# Patient Record
Sex: FEMALE | Race: WHITE | Employment: OTHER | URBAN - METROPOLITAN AREA
[De-identification: names, ages, dates, MRNs, and addresses within clinical notes are randomized per-mention and may not be internally consistent; named-entity substitution may affect disease eponyms.]

---

## 2019-12-09 ENCOUNTER — OFFICE VISIT (OUTPATIENT)
Dept: FAMILY MEDICINE CLINIC | Age: 19
End: 2019-12-09
Payer: MEDICAID

## 2019-12-09 VITALS
DIASTOLIC BLOOD PRESSURE: 72 MMHG | BODY MASS INDEX: 33.37 KG/M2 | OXYGEN SATURATION: 98 % | WEIGHT: 159 LBS | HEART RATE: 97 BPM | HEIGHT: 58 IN | SYSTOLIC BLOOD PRESSURE: 120 MMHG

## 2019-12-09 DIAGNOSIS — Q87.11 PRADER-WILLI SYNDROME: ICD-10-CM

## 2019-12-09 DIAGNOSIS — F41.1 GENERALIZED ANXIETY DISORDER: ICD-10-CM

## 2019-12-09 DIAGNOSIS — Z00.00 ANNUAL PHYSICAL EXAM: Primary | ICD-10-CM

## 2019-12-09 PROBLEM — K62.5 RECTAL BLEEDING: Status: RESOLVED | Noted: 2018-08-09 | Resolved: 2019-12-09

## 2019-12-09 PROBLEM — K62.5 RECTAL BLEEDING: Status: ACTIVE | Noted: 2018-08-09

## 2019-12-09 PROBLEM — H52.31 ANISOMETROPIA: Status: ACTIVE | Noted: 2017-03-07

## 2019-12-09 PROBLEM — R32 INCONTINENCE: Status: ACTIVE | Noted: 2018-11-07

## 2019-12-09 PROBLEM — F80.82 SOCIAL PRAGMATIC LANGUAGE DISORDER: Status: ACTIVE | Noted: 2017-02-19

## 2019-12-09 PROBLEM — K64.4 EXTERNAL HEMORRHOIDS WITH COMPLICATION: Status: ACTIVE | Noted: 2018-12-18

## 2019-12-09 LAB
BILIRUBIN, POC: NORMAL
BLOOD URINE, POC: NORMAL
CLARITY, POC: CLEAR
COLOR, POC: YELLOW
GLUCOSE URINE, POC: NORMAL
KETONES, POC: NORMAL
LEUKOCYTE EST, POC: NORMAL
NITRITE, POC: NORMAL
PH, POC: 5.5
PROTEIN, POC: NORMAL
SPECIFIC GRAVITY, POC: <=1.005
UROBILINOGEN, POC: NORMAL

## 2019-12-09 PROCEDURE — G8482 FLU IMMUNIZE ORDER/ADMIN: HCPCS | Performed by: FAMILY MEDICINE

## 2019-12-09 PROCEDURE — 81002 URINALYSIS NONAUTO W/O SCOPE: CPT | Performed by: FAMILY MEDICINE

## 2019-12-09 PROCEDURE — 90471 IMMUNIZATION ADMIN: CPT | Performed by: FAMILY MEDICINE

## 2019-12-09 PROCEDURE — 99385 PREV VISIT NEW AGE 18-39: CPT | Performed by: FAMILY MEDICINE

## 2019-12-09 PROCEDURE — 90686 IIV4 VACC NO PRSV 0.5 ML IM: CPT | Performed by: FAMILY MEDICINE

## 2019-12-09 RX ORDER — BLOOD SUGAR DIAGNOSTIC
STRIP MISCELLANEOUS
COMMUNITY
Start: 2017-10-24

## 2019-12-09 RX ORDER — FLUTICASONE PROPIONATE 50 MCG
SPRAY, SUSPENSION (ML) NASAL
COMMUNITY
Start: 2017-09-18

## 2019-12-09 RX ORDER — LEVOTHYROXINE SODIUM 0.15 MG/1
150 TABLET ORAL
COMMUNITY
Start: 2019-07-17 | End: 2020-02-13 | Stop reason: SDUPTHER

## 2019-12-09 RX ORDER — SERTRALINE HYDROCHLORIDE 100 MG/1
TABLET, FILM COATED ORAL
COMMUNITY
Start: 2018-07-12 | End: 2021-11-30

## 2019-12-09 RX ORDER — ACETAMINOPHEN 325 MG/1
650 TABLET ORAL
COMMUNITY
Start: 2017-04-21

## 2019-12-09 RX ORDER — TRANSPARENT DRESSING 2"X2.75"
BANDAGE TOPICAL
COMMUNITY
Start: 2018-08-07 | End: 2020-02-13 | Stop reason: SDUPTHER

## 2019-12-09 RX ORDER — OMEPRAZOLE 10 MG/1
10 CAPSULE, DELAYED RELEASE ORAL NIGHTLY
COMMUNITY
Start: 2019-09-24 | End: 2020-02-13 | Stop reason: SDUPTHER

## 2019-12-09 RX ORDER — SYRINGE W-NEEDLE,DISPOSAB,3 ML 25GX5/8"
SYRINGE, EMPTY DISPOSABLE MISCELLANEOUS
COMMUNITY
Start: 2018-12-31 | End: 2020-02-13 | Stop reason: SDUPTHER

## 2019-12-09 RX ORDER — KETOTIFEN FUMARATE 0.35 MG/ML
1 SOLUTION/ DROPS OPHTHALMIC
COMMUNITY
Start: 2019-11-07 | End: 2021-11-30

## 2019-12-09 RX ORDER — POLYETHYLENE GLYCOL 3350 17 G/17G
25.5 POWDER, FOR SOLUTION ORAL
COMMUNITY
Start: 2018-07-18 | End: 2020-02-13 | Stop reason: SDUPTHER

## 2019-12-09 RX ORDER — ACETAMINOPHEN AND CODEINE PHOSPHATE 120; 12 MG/5ML; MG/5ML
0.35 SOLUTION ORAL
COMMUNITY
Start: 2019-08-26 | End: 2020-02-13 | Stop reason: SDUPTHER

## 2019-12-09 RX ORDER — ESTRADIOL 0.1 MG/D
FILM, EXTENDED RELEASE TRANSDERMAL
COMMUNITY
Start: 2018-07-16

## 2019-12-09 RX ORDER — CHOLECALCIFEROL (VITAMIN D3) 125 MCG
2000 CAPSULE ORAL
COMMUNITY

## 2019-12-09 RX ORDER — MODAFINIL 100 MG/1
TABLET ORAL
COMMUNITY
Start: 2018-12-13 | End: 2021-11-30 | Stop reason: ALTCHOICE

## 2019-12-09 RX ORDER — CETIRIZINE HYDROCHLORIDE 10 MG/1
TABLET ORAL
COMMUNITY
Start: 2018-02-21 | End: 2020-02-13 | Stop reason: SDUPTHER

## 2019-12-09 SDOH — HEALTH STABILITY: MENTAL HEALTH: HOW OFTEN DO YOU HAVE A DRINK CONTAINING ALCOHOL?: MONTHLY OR LESS

## 2019-12-09 ASSESSMENT — PATIENT HEALTH QUESTIONNAIRE - PHQ9
SUM OF ALL RESPONSES TO PHQ QUESTIONS 1-9: 2
2. FEELING DOWN, DEPRESSED OR HOPELESS: 1
SUM OF ALL RESPONSES TO PHQ9 QUESTIONS 1 & 2: 2
SUM OF ALL RESPONSES TO PHQ QUESTIONS 1-9: 2
1. LITTLE INTEREST OR PLEASURE IN DOING THINGS: 1

## 2020-02-13 NOTE — TELEPHONE ENCOUNTER
Patient's mom is requesting refill request on briefs as well as other prescriptions, but states they have everything sent through [facility], please advise.

## 2020-02-14 RX ORDER — CETIRIZINE HYDROCHLORIDE 10 MG/1
TABLET ORAL
Qty: 30 TABLET | Refills: 1 | Status: SHIPPED | OUTPATIENT
Start: 2020-02-14 | End: 2020-10-27

## 2020-02-14 RX ORDER — POLYETHYLENE GLYCOL 3350 17 G/17G
25.5 POWDER, FOR SOLUTION ORAL DAILY
Qty: 1 BOTTLE | Refills: 1 | Status: SHIPPED | OUTPATIENT
Start: 2020-02-14 | End: 2020-10-27 | Stop reason: SDUPTHER

## 2020-02-14 RX ORDER — SYRINGE W-NEEDLE,DISPOSAB,3 ML 25GX5/8"
SYRINGE, EMPTY DISPOSABLE MISCELLANEOUS
Qty: 100 EACH | Refills: 1 | Status: SHIPPED | OUTPATIENT
Start: 2020-02-14 | End: 2020-02-14 | Stop reason: SDUPTHER

## 2020-02-14 RX ORDER — OMEPRAZOLE 10 MG/1
10 CAPSULE, DELAYED RELEASE ORAL NIGHTLY
Qty: 30 CAPSULE | Refills: 1 | Status: SHIPPED | OUTPATIENT
Start: 2020-02-14 | End: 2020-06-08

## 2020-02-14 RX ORDER — TRANSPARENT DRESSING 2"X2.75"
BANDAGE TOPICAL
Qty: 100 EACH | Refills: 1 | Status: SHIPPED | OUTPATIENT
Start: 2020-02-14 | End: 2020-02-14 | Stop reason: SDUPTHER

## 2020-02-14 RX ORDER — ACETAMINOPHEN AND CODEINE PHOSPHATE 120; 12 MG/5ML; MG/5ML
0.35 SOLUTION ORAL DAILY
Qty: 30 TABLET | Refills: 1 | Status: SHIPPED | OUTPATIENT
Start: 2020-02-14 | End: 2020-02-14 | Stop reason: SDUPTHER

## 2020-02-14 RX ORDER — ACETAMINOPHEN AND CODEINE PHOSPHATE 120; 12 MG/5ML; MG/5ML
0.35 SOLUTION ORAL DAILY
Qty: 30 TABLET | Refills: 1 | Status: SHIPPED | OUTPATIENT
Start: 2020-02-14 | End: 2021-11-30

## 2020-02-14 RX ORDER — CETIRIZINE HYDROCHLORIDE 10 MG/1
TABLET ORAL
Qty: 30 TABLET | Refills: 1 | Status: SHIPPED | OUTPATIENT
Start: 2020-02-14 | End: 2020-02-14 | Stop reason: SDUPTHER

## 2020-02-14 RX ORDER — SYRINGE W-NEEDLE,DISPOSAB,3 ML 25GX5/8"
SYRINGE, EMPTY DISPOSABLE MISCELLANEOUS
Qty: 100 EACH | Refills: 1 | Status: SHIPPED | OUTPATIENT
Start: 2020-02-14

## 2020-02-14 RX ORDER — LEVOTHYROXINE SODIUM 0.15 MG/1
150 TABLET ORAL DAILY
Qty: 30 TABLET | Refills: 1 | Status: SHIPPED | OUTPATIENT
Start: 2020-02-14 | End: 2021-11-30

## 2020-02-14 RX ORDER — TRANSPARENT DRESSING 2"X2.75"
BANDAGE TOPICAL
Qty: 100 EACH | Refills: 1 | Status: SHIPPED | OUTPATIENT
Start: 2020-02-14 | End: 2021-11-30

## 2020-02-14 RX ORDER — OMEPRAZOLE 10 MG/1
10 CAPSULE, DELAYED RELEASE ORAL NIGHTLY
Qty: 30 CAPSULE | Refills: 1 | Status: SHIPPED | OUTPATIENT
Start: 2020-02-14 | End: 2020-02-14 | Stop reason: SDUPTHER

## 2020-02-14 RX ORDER — LEVOTHYROXINE SODIUM 0.15 MG/1
150 TABLET ORAL DAILY
Qty: 30 TABLET | Refills: 1 | Status: SHIPPED | OUTPATIENT
Start: 2020-02-14 | End: 2020-02-14 | Stop reason: SDUPTHER

## 2020-02-14 RX ORDER — MODAFINIL 100 MG/1
TABLET ORAL
Qty: 30 TABLET | Refills: 1 | OUTPATIENT
Start: 2020-02-14 | End: 2020-03-14

## 2020-02-14 RX ORDER — POLYETHYLENE GLYCOL 3350 17 G/17G
25.5 POWDER, FOR SOLUTION ORAL DAILY
Qty: 1 BOTTLE | Refills: 1 | Status: SHIPPED | OUTPATIENT
Start: 2020-02-14 | End: 2020-02-14 | Stop reason: SDUPTHER

## 2020-02-14 NOTE — TELEPHONE ENCOUNTER
Yamilet - cannot fill these prescriptions as outpatient orders from their pharmacy. [phone] (home)   Called patient, spoke to mother    Reloaded all medications and pended w/ new pharmacy.    Please resend

## 2020-02-17 ENCOUNTER — TELEPHONE (OUTPATIENT)
Dept: FAMILY MEDICINE CLINIC | Age: 20
End: 2020-02-17

## 2020-02-17 NOTE — TELEPHONE ENCOUNTER
Done on 02.14.2020:       polyethylene glycol (GLYCOLAX) powder [[id number]]     Order Details   Dose: 26 g Route: Oral Frequency: DAILY   Dispense Quantity: 1 Bottle Refills: 1 Fills remaining: --           Sig: Take 26 g by mouth daily          Written Date: 02/14/20 Expiration Date: 02/13/21     Start Date: 02/14/20 End Date: --     Ordering Provider:  -- Authorizing Provider: Alonzo Triana MD Ordering User:  Alonzo Triana MD             Original Order:  polyethylene glycol [facility]) powder [[id number]]      Pharmacy:  [facility] [employer] [address], [facility] [address] [phone] - F [phone]          Syringe/Needle, Disp, (SYRINGE 3CC/22GX1\") 22G X 1\" 3 ML MISC [[id number]]     Order Details   Dose, Route, Frequency: As Directed    Dispense Quantity: 100 each Refills: 1 Fills remaining: --           Sig: FOR MIXING DILUENT AND GROWTH HORMONE MEDICATION ONLY          Written Date: 02/14/20 Expiration Date: 02/13/21     Start Date: 02/14/20 End Date: --     Ordering Provider:  -- Authorizing Provider: Alonzo Triana MD Ordering User:  Alonzo Triana MD             Original Order:  Syringe/Needle, Disp, (SYRINGE 3CC/22GX1\") 22G X 1\" 3 ML MISC [[id number]]      Pharmacy:  [facility] [employer] [address], [place] [address] [address] [phone] [facility] [phone]      Pharmacy Comments:  --

## 2020-02-17 NOTE — TELEPHONE ENCOUNTER
Pharmacy needs clarification on quantity Glycolax and the frequency for syringes.     Medication:    polyethylene glycol (GLYCOLAX) powder     Syringe/Needle, Disp, (SYRINGE 3CC/22GX1\") [facility] X 1\" [facility]           Pharmacy:    [facility] [address], [id number] [address]

## 2020-02-19 ENCOUNTER — TELEPHONE (OUTPATIENT)
Dept: FAMILY MEDICINE CLINIC | Age: 20
End: 2020-02-19

## 2020-02-26 NOTE — TELEPHONE ENCOUNTER
LMOM for [facility]  to Gadiel Schmidt. Please inform pt of message below.   Thanks

## 2020-02-27 NOTE — TELEPHONE ENCOUNTER
Spoke to Nehal hernandez:  He is going to  form. Form is up front for p/u.     Forms have been scanned in

## 2020-03-04 ENCOUNTER — TELEPHONE (OUTPATIENT)
Dept: INTERNAL MEDICINE CLINIC | Age: 20
End: 2020-03-04

## 2020-03-04 NOTE — TELEPHONE ENCOUNTER
Approval for Prep Protective Skin Barrier 0.5% pads,angel britt alana once received. .Please notify patient, thank you.

## 2020-05-07 ENCOUNTER — TELEPHONE (OUTPATIENT)
Dept: FAMILY MEDICINE CLINIC | Age: 20
End: 2020-05-07

## 2020-05-08 ENCOUNTER — TELEMEDICINE (OUTPATIENT)
Dept: FAMILY MEDICINE CLINIC | Age: 20
End: 2020-05-08
Payer: MEDICAID

## 2020-05-08 PROCEDURE — G8427 DOCREV CUR MEDS BY ELIG CLIN: HCPCS | Performed by: FAMILY MEDICINE

## 2020-05-08 PROCEDURE — 99213 OFFICE O/P EST LOW 20 MIN: CPT | Performed by: FAMILY MEDICINE

## 2020-05-08 RX ORDER — SULFAMETHOXAZOLE AND TRIMETHOPRIM 400; 80 MG/1; MG/1
2 TABLET ORAL 2 TIMES DAILY
Qty: 12 TABLET | Refills: 0 | Status: SHIPPED | OUTPATIENT
Start: 2020-05-08 | End: 2020-05-11

## 2020-05-08 NOTE — PROGRESS NOTES
Units by mouth  Historical Provider, MD   acetaminophen (TYLENOL) 325 MG tablet Take 650 mg by mouth  Historical Provider, MD   Calcium Carbonate Antacid 1000 MG CHEW Take 1,000 mg by mouth  Historical Provider, MD   sertraline (ZOLOFT) 100 MG tablet TAKE TWO TABLETS BY MOUTH EVERY NIGHT AT BEDTIME  Historical Provider, MD   estradiol (VIVELLE) 0.1 MG/24HR APPLY ONE PATCH TO SKIN TWICE A WEEK . DO NOT CUT THE PATCH  Historical Provider, MD   fluticasone (FLONASE) 50 MCG/ACT nasal spray PLACE TWO SPRAYS IN EACH NOSTRIL ONCE DAILY  Historical Provider, MD   ketotifen (ZADITOR) 0.025 % ophthalmic solution Apply 1 drop to eye  Historical Provider, MD   Insulin Syringe-Needle U-100 (B-D INS SYR HALF-UNIT .3CC/31G) 31G X 5/16\" 0.3 ML MISC Use as directed to administer growth hormone medication daily  Historical Provider, MD   Somatropin 5.8 MG SOLR INJECT 0.4MG SUBCUTANEOUSLY ONCE DAILY. DISCARD VIAL 21 DAYS AFTER RECONSTITUTION. REFRIGERATE. Historical Provider, MD       Social History     Tobacco Use    Smoking status: Smoker, Current Status Unknown    Smokeless tobacco: Current User    Tobacco comment: vape stick   Substance Use Topics    Alcohol use: Yes     Frequency: Monthly or less    Drug use: Never          PHYSICAL EXAMINATION:  [ INSTRUCTIONS:  \"[x]\" Indicates a positive item  \"[]\" Indicates a negative item  -- DELETE ALL ITEMS NOT EXAMINED]  Vital Signs: (As obtained by patient/caregiver or practitioner observation)    Blood pressure-  Heart rate-    Respiratory rate-    Temperature-  Pulse oximetry-     Constitutional: [x] Appears well-developed and well-nourished [] No apparent distress      [] Abnormal-   Mental status  [x] Alert and awake  [] Oriented to person/place/time []Able to follow commands      Eyes:  EOM    []  Normal  [] Abnormal-  Sclera  []  Normal  [] Abnormal -         Discharge []  None visible  [] Abnormal -    HENT:   [] Normocephalic, atraumatic.   [] Abnormal   [] Mouth/Throat:

## 2020-06-08 RX ORDER — OMEPRAZOLE 10 MG/1
CAPSULE, DELAYED RELEASE ORAL
Qty: 30 CAPSULE | Refills: 0 | Status: SHIPPED | OUTPATIENT
Start: 2020-06-08 | End: 2021-01-07 | Stop reason: SDUPTHER

## 2020-07-06 ENCOUNTER — TELEPHONE (OUTPATIENT)
Dept: FAMILY MEDICINE CLINIC | Age: 20
End: 2020-07-06

## 2020-09-17 ENCOUNTER — OFFICE VISIT (OUTPATIENT)
Dept: FAMILY MEDICINE CLINIC | Age: 20
End: 2020-09-17
Payer: MEDICARE

## 2020-09-17 VITALS
SYSTOLIC BLOOD PRESSURE: 136 MMHG | TEMPERATURE: 97.3 F | HEART RATE: 72 BPM | WEIGHT: 170.6 LBS | BODY MASS INDEX: 34.39 KG/M2 | HEIGHT: 59 IN | OXYGEN SATURATION: 98 % | DIASTOLIC BLOOD PRESSURE: 90 MMHG

## 2020-09-17 LAB
BACTERIA: ABNORMAL /HPF
BILIRUBIN URINE: NEGATIVE
BLOOD, URINE: NEGATIVE
CLARITY: ABNORMAL
COLOR: YELLOW
EPITHELIAL CELLS, UA: 12 /HPF (ref 0–5)
GLUCOSE URINE: NEGATIVE MG/DL
KETONES, URINE: NEGATIVE MG/DL
LEUKOCYTE ESTERASE, URINE: NEGATIVE
MICROSCOPIC EXAMINATION: YES
NITRITE, URINE: NEGATIVE
PH UA: 6 (ref 5–8)
PROTEIN UA: 30 MG/DL
RBC UA: ABNORMAL /HPF (ref 0–4)
SPECIFIC GRAVITY UA: 1.03 (ref 1–1.03)
URINE TYPE: ABNORMAL
UROBILINOGEN, URINE: 0.2 E.U./DL
WBC UA: 12 /HPF (ref 0–5)

## 2020-09-17 PROCEDURE — 99395 PREV VISIT EST AGE 18-39: CPT | Performed by: FAMILY MEDICINE

## 2020-09-17 ASSESSMENT — PATIENT HEALTH QUESTIONNAIRE - PHQ9
1. LITTLE INTEREST OR PLEASURE IN DOING THINGS: 0
SUM OF ALL RESPONSES TO PHQ9 QUESTIONS 1 & 2: 0
SUM OF ALL RESPONSES TO PHQ QUESTIONS 1-9: 0
2. FEELING DOWN, DEPRESSED OR HOPELESS: 0
SUM OF ALL RESPONSES TO PHQ QUESTIONS 1-9: 0

## 2020-09-24 ENCOUNTER — VIRTUAL VISIT (OUTPATIENT)
Dept: FAMILY MEDICINE CLINIC | Age: 20
End: 2020-09-24
Payer: MEDICARE

## 2020-09-24 ENCOUNTER — TELEPHONE (OUTPATIENT)
Dept: FAMILY MEDICINE CLINIC | Age: 20
End: 2020-09-24

## 2020-09-24 PROCEDURE — 99213 OFFICE O/P EST LOW 20 MIN: CPT | Performed by: FAMILY MEDICINE

## 2020-09-24 PROCEDURE — G8427 DOCREV CUR MEDS BY ELIG CLIN: HCPCS | Performed by: FAMILY MEDICINE

## 2020-09-24 NOTE — TELEPHONE ENCOUNTER
----- Message from Melvin Powers sent at 9/24/2020  2:59 PM EDT -----  Subject: Appointment Request    Reason for Call: Urgent Back Neck Pain    QUESTIONS  Type of Appointment? Established Patient  Reason for appointment request? No appointments available during search  Additional Information for Provider? Ayesha White in [facility] joints are   aching.   ---------------------------------------------------------------------------  --------------  CALL BACK INFO  What is the best way for the office to contact you? OK to leave message on   voicemail  Preferred Call Back Phone Number? [phone]  ---------------------------------------------------------------------------  --------------  SCRIPT ANSWERS  Relationship to Patient? Parent  Representative Name? Day Lunsford   Additional information verified (besides Name and Date of Birth)? Last 4   SSN  Appointment reason? Symptomatic  Select script based on patient symptoms? Adult Back or Neck Pain [Slipped   disc   Herniated disc   sciatica]  Did you have an injury or trauma within the past 3 days? No  Are you having numbness   tingling   or weakness in your arms and/or legs with this pain? No  Are you having new problems with your bowel or bladder control? No  Are you having fevers (100.4)   chills   or sweats? No  Did your pain begin within the past 14 days? Yes  Is your pain affecting your daily activities or employment? Yes  Have you been diagnosed with   tested for   or told that you are suspected of having COVID-19 (Coronavirus)? No  Have you had a fever or taken medication to treat a fever within the past   3 days? No  Have you had a cough   shortness of breath or flu-like symptoms within the past 3 days? No  Do you currently have flu-like symptoms including fever or chills   cough   shortness of breath   or difficulty breathing   or new loss of taste or smell? No  (Service Expert  click yes below to proceed with [employer] As Usual   Scheduling)?  Yes

## 2020-09-24 NOTE — PROGRESS NOTES
2020    TELEHEALTH EVALUATION -- Audio/Visual (During LQLRK-91 public health emergency)    HPI:    Brandy West (:  2000) has requested an audio/video evaluation for the following concern(s): She happened to follow in about while taking shower  She slipped on the shampoo and fell and hit her left ankle and left shoulder and has been having pain in her left side of the neck  Denies any loss of consciousness  Denies any headache  She remembers the whole episode    She has noticed mild swelling  But she is able to move her ankle and shoulder. Review of Systems:  Gen:  Denies fever, chills, headaches. No weight loss  HEENT:  Denies cold symptoms, sore throat. CV:  Denies chest pain or tightness, palpitations. Pulm:  Denies shortness of breath, cough. Abd:  Denies abdominal pain, change in bowel habits. Prior to Visit Medications    Medication Sig Taking?  Authorizing Provider   omeprazole (PRILOSEC) 10 MG delayed release capsule TAKE ONE CAPSULE BY MOUTH ONCE NIGHTLY Yes Dante Dougherty MD   Syringe/Needle, Disp, (SYRINGE 3CC/22GX1\") 22G X 1\" 3 ML MISC FOR MIXING DILUENT AND GROWTH HORMONE MEDICATION ONLY Yes Dante Dougherty MD   Transparent Dressings (TEGADERM + PAD 2\"X2-3/4\") MISC APPLY NEW PATCH TWICE WEEKLY WITH ESTRADIOL PATCH Yes Dante Dougherty MD   polyethylene glycol (GLYCOLAX) powder Take 26 g by mouth daily Yes Dante Dougherty MD   norethindrone (MICRONOR) 0.35 MG tablet Take 1 tablet by mouth daily Yes Dante Dougherty MD   magnesium hydroxide (MILK OF MAGNESIA) 400 MG/5ML suspension Take 30 mLs by mouth daily as needed for Constipation Yes Dante Dougherty MD   levothyroxine (SYNTHROID) 150 MCG tablet Take 1 tablet by mouth Daily Yes Dante Dougherty MD   Chloroxylenol, Antiseptic, 0.5 % PADS APPLY ONE PAD TO THE SKIN TWO TIMES A WEEK TO PREP FOR PATCH Yes Dante Dougherty MD   cetirizine (ZYRTEC) 10 MG tablet TAKE ONE TABLET BY MOUTH DAILY Yes Dante Dougherty MD person/place/time. Able to follow commands    Eyes: EOM normal. Sclera normal. No discharge visible  HENT: Normocephalic, atraumatic. Mouth/Throat: Mucous membranes are moist. External Ears Normal    Neck: No visualized mass   Pulmonary/Chest: Respiratory effort normal.  No visualized signs of difficulty breathing or respiratory distress        MSK able to move her left ankle and mild swelling and normal range of motion on her left shoulder and normal cervical movement           ASSESSMENT/PLAN:  1. Fall, initial encounter  Accidental fall  And advised for ice pack and advil for pain prn  And on exam over the video no concerns of fracture and advised if her symptoms worsen to call      Kiki Gracia is a 23 y.o. female being evaluated by a Virtual Visit (video visit) encounter to address concerns as mentioned above. A caregiver was present when appropriate. Due to this being a TeleHealth encounter (During [facility] public health emergency), evaluation of the following organ systems was limited: Vitals/Constitutional/EENT/Resp/CV/GI//MS/Neuro/Skin/Heme-Lymph-Imm. Pursuant to the emergency declaration under the [address] [place] [address], [address] [place] [address] authority and the [employer] Act, this Virtual Visit was conducted with patient's (and/or legal guardian's) consent, to reduce the patient's risk of exposure to COVID-19 and provide necessary medical care. The patient (and/or legal guardian) has also been advised to contact this office for worsening conditions or problems, and seek emergency medical treatment and/or call 911 if deemed necessary. Patient identification was verified at the start of the visit: Yes    Total time spent on this encounter: 15 min minutes. Services were provided through a video synchronous discussion virtually to substitute for in-person clinic visit. Patient was located in their home.  Provider was located in the office. --Lorrie Zuleta MD on 9/24/2020 at 5:30 PM    An electronic signature was used to authenticate this note. Miladys Price

## 2020-09-27 RX ORDER — TIZANIDINE 2 MG/1
2 TABLET ORAL EVERY 8 HOURS PRN
Qty: 10 TABLET | Refills: 0 | Status: SHIPPED | OUTPATIENT
Start: 2020-09-27 | End: 2020-10-02

## 2020-10-27 ENCOUNTER — VIRTUAL VISIT (OUTPATIENT)
Dept: FAMILY MEDICINE CLINIC | Age: 20
End: 2020-10-27
Payer: MEDICARE

## 2020-10-27 PROCEDURE — G0438 PPPS, INITIAL VISIT: HCPCS | Performed by: FAMILY MEDICINE

## 2020-10-27 PROCEDURE — G8484 FLU IMMUNIZE NO ADMIN: HCPCS | Performed by: FAMILY MEDICINE

## 2020-10-27 RX ORDER — CETIRIZINE HYDROCHLORIDE 10 MG/1
10 TABLET ORAL DAILY
Qty: 30 TABLET | Refills: 0 | Status: SHIPPED | OUTPATIENT
Start: 2020-10-27 | End: 2020-11-26

## 2020-10-27 RX ORDER — POLYETHYLENE GLYCOL 3350 17 G/17G
25.5 POWDER, FOR SOLUTION ORAL DAILY
Qty: 1 BOTTLE | Refills: 1 | Status: SHIPPED | OUTPATIENT
Start: 2020-10-27

## 2020-10-27 RX ORDER — FLUTICASONE PROPIONATE 50 MCG
2 SPRAY, SUSPENSION (ML) NASAL DAILY
Qty: 1 BOTTLE | Refills: 5 | Status: SHIPPED | OUTPATIENT
Start: 2020-10-27

## 2020-10-27 RX ORDER — CETIRIZINE HYDROCHLORIDE 10 MG/1
10 TABLET ORAL DAILY
Qty: 30 TABLET | Refills: 0 | Status: SHIPPED | OUTPATIENT
Start: 2020-10-27 | End: 2020-10-27 | Stop reason: SDUPTHER

## 2020-10-27 ASSESSMENT — LIFESTYLE VARIABLES
HAS A RELATIVE, FRIEND, DOCTOR, OR ANOTHER HEALTH PROFESSIONAL EXPRESSED CONCERN ABOUT YOUR DRINKING OR SUGGESTED YOU CUT DOWN: 0
HOW OFTEN DURING THE LAST YEAR HAVE YOU HAD A FEELING OF GUILT OR REMORSE AFTER DRINKING: 0
HOW OFTEN DO YOU HAVE A DRINK CONTAINING ALCOHOL: 1
HAVE YOU OR SOMEONE ELSE BEEN INJURED AS A RESULT OF YOUR DRINKING: 0
HOW OFTEN DO YOU HAVE SIX OR MORE DRINKS ON ONE OCCASION: 0
HOW OFTEN DURING THE LAST YEAR HAVE YOU NEEDED AN ALCOHOLIC DRINK FIRST THING IN THE MORNING TO GET YOURSELF GOING AFTER A NIGHT OF HEAVY DRINKING: 0
HOW OFTEN DURING THE LAST YEAR HAVE YOU FAILED TO DO WHAT WAS NORMALLY EXPECTED FROM YOU BECAUSE OF DRINKING: 0
AUDIT-C TOTAL SCORE: 1
HOW MANY STANDARD DRINKS CONTAINING ALCOHOL DO YOU HAVE ON A TYPICAL DAY: 0
HOW OFTEN DURING THE LAST YEAR HAVE YOU BEEN UNABLE TO REMEMBER WHAT HAPPENED THE NIGHT BEFORE BECAUSE YOU HAD BEEN DRINKING: 0
AUDIT TOTAL SCORE: 1
HOW OFTEN DURING THE LAST YEAR HAVE YOU FOUND THAT YOU WERE NOT ABLE TO STOP DRINKING ONCE YOU HAD STARTED: 0

## 2020-10-27 ASSESSMENT — PATIENT HEALTH QUESTIONNAIRE - PHQ9
SUM OF ALL RESPONSES TO PHQ QUESTIONS 1-9: 0
SUM OF ALL RESPONSES TO PHQ9 QUESTIONS 1 & 2: 0
1. LITTLE INTEREST OR PLEASURE IN DOING THINGS: 0
SUM OF ALL RESPONSES TO PHQ QUESTIONS 1-9: 0
SUM OF ALL RESPONSES TO PHQ QUESTIONS 1-9: 0
2. FEELING DOWN, DEPRESSED OR HOPELESS: 0

## 2020-10-27 NOTE — PATIENT INSTRUCTIONS
Personalized Preventive Plan for Teri Anderson - 10/27/2020  Medicare offers a range of preventive health benefits. Some of the tests and screenings are paid in full while other may be subject to a deductible, co-insurance, and/or copay. Some of these benefits include a comprehensive review of your medical history including lifestyle, illnesses that may run in your family, and various assessments and screenings as appropriate. After reviewing your medical record and screening and assessments performed today your provider may have ordered immunizations, labs, imaging, and/or referrals for you. A list of these orders (if applicable) as well as your Preventive Care list are included within your After Visit Summary for your review. Other Preventive Recommendations:    · A preventive eye exam performed by an eye specialist is recommended every 1-2 years to screen for glaucoma; cataracts, macular degeneration, and other eye disorders. · A preventive dental visit is recommended every 6 months. · Try to get at least 150 minutes of exercise per week or 10,000 steps per day on a pedometer . · Order or download the FREE \"Exercise & Physical Activity: Your Everyday Guide\" from [employer]. Call 6-853.291.7821 or search [employer] online. · You need 5246-2912 mg of calcium and 2738-9293 IU of vitamin D per day. It is possible to meet your calcium requirement with diet alone, but a vitamin D supplement is usually necessary to meet this goal.  · When exposed to the sun, use a sunscreen that protects against both UVA and UVB radiation with an SPF of 30 or greater. Reapply every 2 to 3 hours or after sweating, drying off with a towel, or swimming. · Always wear a seat belt when traveling in a car. Always wear a helmet when riding a bicycle or motorcycle.

## 2020-10-27 NOTE — PROGRESS NOTES
Medicare Annual Wellness Visit  Name: James Barlow Date: 10/27/2020   MRN: <[id number]> Sex: Female   Age: 23 y.o. Ethnicity: Non-/Non    : 2000 Race: Darian Baldwin is here for Medicare AWV  She is here for annual wellness visit. 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 She also has history of hypothyroidism. She follows up with endocrinologist.     She has mild intellectual disability  She has postpartum depression and she follows up with psychiatrist  Screenings for behavioral, psychosocial and functional/safety risks, and cognitive dysfunction are all negative except as indicated below. These results, as well as other patient data from the [address] form, are documented in Flowsheets linked to this Encounter. Allergies   Allergen Reactions    Azithromycin Diarrhea    Mosquito (Culex Pipiens) Allergy Skin Test      MOSQUITO BITES - Welts         Prior to Visit Medications    Medication Sig Taking?  Authorizing Provider   fluticasone (FLONASE) 50 MCG/ACT nasal spray 2 sprays by Each Nostril route daily Yes Marija Rico MD   cetirizine (ZYRTEC) 10 MG tablet Take 1 tablet by mouth daily Yes Marija Rico MD   polyethylene glycol (GLYCOLAX) 17 GM/SCOOP powder Take 26 g by mouth daily Yes Marija Rico MD   omeprazole (PRILOSEC) 10 MG delayed release capsule TAKE ONE CAPSULE BY MOUTH ONCE NIGHTLY Yes Marija Rico MD   Syringe/Needle, Disp, (SYRINGE 3CC/22GX1\") 22G X 1\" 3 ML MISC FOR MIXING DILUENT AND GROWTH HORMONE MEDICATION ONLY Yes Mariaj Rico MD   Transparent Dressings (TEGADERM + PAD 2\"X2-3/4\") MISC APPLY NEW PATCH TWICE WEEKLY WITH ESTRADIOL PATCH Yes Marija Rico MD   norethindrone (MICRONOR) 0.35 MG tablet Take 1 tablet by mouth daily Yes Marija Rico MD   magnesium hydroxide (MILK OF MAGNESIA) 400 MG/5ML suspension Take 30 mLs by mouth daily as needed for Constipation Yes Luis Jones MD   levothyroxine (SYNTHROID) 150 MCG tablet Take 1 tablet by mouth Daily Yes Luis Jones MD   Chloroxylenol, Antiseptic, 0.5 % PADS APPLY ONE PAD TO THE SKIN TWO TIMES A WEEK TO PREP FOR PATCH Yes Luis Jones MD   modafinil (PROVIGIL) 100 MG tablet Take 1 tablet (=100 mg) by mouth twice daily, every morning and early afternoon. There should be at least 4 hours in between doses. Yes Historical Provider, MD   Cholecalciferol (VITAMIN D3) 50 MCG (2000 UT) TABS Take 2,000 Units by mouth Yes Historical Provider, MD   acetaminophen (TYLENOL) 325 MG tablet Take 650 mg by mouth Yes Historical Provider, MD   Calcium Carbonate Antacid 1000 MG CHEW Take 1,000 mg by mouth Yes Historical Provider, MD   sertraline (ZOLOFT) 100 MG tablet TAKE TWO TABLETS BY MOUTH EVERY NIGHT AT BEDTIME Yes Historical Provider, MD   estradiol (VIVELLE) 0.1 MG/24HR APPLY ONE PATCH TO SKIN TWICE A WEEK . DO NOT CUT THE PATCH Yes Historical Provider, MD   fluticasone (FLONASE) 50 MCG/ACT nasal spray PLACE TWO SPRAYS IN EACH NOSTRIL ONCE DAILY Yes Historical Provider, MD   ketotifen (ZADITOR) 0.025 % ophthalmic solution Apply 1 drop to eye Yes Historical Provider, MD   Insulin Syringe-Needle U-100 (B-D INS SYR HALF-UNIT .3CC/31G) 31G X 5/16\" 0.3 ML MISC Use as directed to administer growth hormone medication daily Yes Historical Provider, MD   Somatropin 5.8 MG SOLR INJECT 0.4MG SUBCUTANEOUSLY ONCE DAILY. DISCARD VIAL 21 DAYS AFTER RECONSTITUTION. REFRIGERATE.  Yes Historical Provider, MD [place] (Including outside providers/suppliers regularly involved in providing care):   Patient Care Team:  Luis Jones MD as PCP - General (Family Medicine)  Luis Jones MD as PCP - [facility] Empaneled Provider    Wt Readings from Last 3 Encounters:   09/17/20 170 lb 9.6 oz (77.4 kg)   12/09/19 159 lb (72.1 kg)     Patient-Reported Vitals 10/27/2020   Patient-Reported Weight 172.9 lbs Patient-Reported Height 4'11      There is no height or weight on file to calculate BMI. Based upon direct observation of the patient, evaluation of cognition reveals intact    Constitutional: Appears well-developed and well-nourished. No apparent distress    Mental status: Alert and awake. Oriented to person/place/time. Able to follow commands    Eyes: EOM normal. Sclera normal. No discharge visible  HENT: Normocephalic, atraumatic. Mouth/Throat: Mucous membranes are moist. External Ears Normal    Neck: No visualized mass   Pulmonary/Chest: Respiratory effort normal.  No visualized signs of difficulty breathing or respiratory distress        Musculoskeletal:  Normal range of motion of neck  Neurological:       No Facial Asymmetry (Cranial nerve 7 motor function) (limited exam to video visit). No gaze palsy       Skin:  No significant exanthematous lesions or discoloration noted on facial skin       Psychiatric: Normal Affect. No Hallucinations     Patient's complete Health Risk Assessment and screening values have been reviewed and are found in Flowsheets. The following problems were reviewed today and where indicated follow up appointments were made and/or referrals ordered. Positive Risk Factor Screenings with Interventions:     Substance History:  Social History     Tobacco History     Smoking Status  Smoker, Current Status Unknown    Smokeless Tobacco Use  Current User    Tobacco Comment  vape stick          Alcohol History     Alcohol Use Status  Yes          Drug Use     Drug Use Status  Never          Sexual Activity     Sexually Active  Not Currently Partners  Male Birth Control/Protection  Other-see comments               Alcohol Screening: Audit-C Score: 1  Total Score: 1    A score of 8 or more is associated with harmful or hazardous drinking. A score of 13 or more in women, and 15 or more in men, is likely to indicate alcohol dependence.   Substance Abuse Interventions:  Currently declines    General Health and ACP:  General  In general, how would you say your health is?: Fair  In the past 7 days, have you experienced any of the following? New or Increased Pain, New or Increased Fatigue, Loneliness, Social Isolation, Stress or Anger?: None of These  Do you get the social and emotional support that you need?: Yes  Do you have a Living Will?: (!) No  Advance Directives     Power of [address] Will ACP-Advance Directive ACP-Power of     Not on File Not on [address] Interventions:  · Advised on exercise    Health Habits/Nutrition:  Health Habits/Nutrition  Do you exercise for at least 20 minutes 2-3 times per week?: (!) No  Have you lost any weight without trying in the past 3 months?: No  Do you eat fewer than 2 meals per day?: No  Have you seen a dentist within the past year?: Yes     · healthy eating and exercise recommended    Safety:  Safety  Do you have working smoke detectors?: Yes  Have all throw rugs been removed or fastened?: (!) No  Do you have non-slip mats or surfaces in all bathtubs/showers?: Yes  Do all of your stairways have a railing or banister?: Yes  Are your doorways, halls and stairs free of clutter?: Yes  Do you always fasten your seatbelt when you are in a car?: Yes  · no recent falls    ADL:  ADLs  In the past 7 days, did you need help from others to perform any of the following everyday activities? Eating, dressing, grooming, bathing, toileting, or walking/balance?: (!) Eating, Bathing  In the past 7 days, did you need help from others to take care of any of the following?  Laundry, housekeeping, banking/finances, shopping, telephone use, food preparation, transportation, or taking medications?: (!) Transportation  ADL Interventions:  · Lives with mom    Personalized Preventive Plan   Current Health Maintenance Status  Immunization History   Administered Date(s) Administered    HPV 9-valent Reese Mello) 02/16/2011, 02/06/2012, 07/01/2013    HPV Quadrivalent (Gardasil) 02/16/2011, 02/06/2012, 07/01/2013    Influenza A (S2I5-96) Vaccine PF IM 11/18/2009    Influenza Vaccine, unspecified formulation 11/18/2009, 09/26/2011, 03/14/2014, 09/09/2014, 10/03/2015, 11/08/2016, 10/04/2017    Influenza, MDCK Quadv, IM, PF (Flucelvax 4 yrs and older) 09/20/2018    Influenza, Sagastume Jacks, IM, PF (6 mo and older Fluzone, Flulaval, Fluarix, and 3 yrs and older Afluria) 12/09/2019    MMR 02/16/2011, 02/16/2011    Meningococcal MCV4P (Menactra) 02/16/2011, 02/16/2011, 10/03/2015, 10/03/2015    Tdap (Boostrix, Adacel) 02/16/2011, 02/16/2011        Health Maintenance   Topic Date Due    Varicella vaccine (1 of 2 - 2-dose childhood series) 09/11/2000    Pneumococcal 0-64 years Vaccine (1 of 1 - PPSV23) 09/11/2005    HIV screen  09/11/2014    Chlamydia screen  09/11/2015    Flu vaccine (1) 09/01/2020    Cervical cancer screen  09/11/2020    Annual Wellness Visit (AWV)  09/17/2020    DTaP/Tdap/Td vaccine (2 - Td) 02/16/2021    TSH testing  09/17/2021    HPV vaccine  Completed    Meningococcal (ACWY) vaccine  Completed    Hepatitis A vaccine  Aged Out    Hepatitis B vaccine  Aged Out    Hib vaccine  Aged Out     Recommendations for [place] Due: see orders and patient instructions/AVS.  . Recommended screening schedule for the next 5-10 years is provided to the patient in written form: see Patient Instructions/AVS.    Kirk Wilkinson was seen today for medicare awv. Diagnoses and all orders for this visit:    Encounter for annual wellness visit (AWV) in Medicare patient  Due for flu shot   And gyne exam and advised    Allergies rhinitis   -     fluticasone (FLONASE) 50 MCG/ACT nasal spray; 2 sprays by Each Nostril route daily  -     Discontinue: cetirizine (ZYRTEC) 10 MG tablet; Take 1 tablet by mouth daily  -     cetirizine (ZYRTEC) 10 MG tablet;  Take 1 tablet by mouth daily    Constipation  -     polyethylene glycol (GLYCOLAX) 17 GM/SCOOP powder; Take 26 g by mouth daily              Karmen Carroll is a 23 y.o. female being evaluated by a Virtual Visit (video and audio) encounter to address concerns as mentioned above. A caregiver was present when appropriate. Due to this being a TeleHealth encounter (During PDBWJ-46 public health emergency), evaluation of the following organ systems was limited: Vitals/Constitutional/EENT/Resp/CV/GI//MS/Neuro/Skin/Heme-Lymph-Imm. Pursuant to the emergency declaration under the [address] [place] [address], [address] [place] [address] authority and the [employer] Act, this Virtual Visit was conducted with patient's (and/or legal guardian's) consent, to reduce the patient's risk of exposure to COVID-19 and provide necessary medical care. The patient (and/or legal guardian) has also been advised to contact this office for worsening conditions or problems, and seek emergency medical treatment and/or call 911 if deemed necessary. Patient identification was verified at the start of the visit: Yes    Services were provided through a video synchronous discussion virtually to substitute for in-person clinic visit. Patient and provider were located at their individual homes. --Marina Maya MD on 10/27/2020 at 5:16 PM    An electronic signature was used to authenticate this note.

## 2020-10-28 ENCOUNTER — NURSE TRIAGE (OUTPATIENT)
Dept: OTHER | Facility: CLINIC | Age: 20
End: 2020-10-28

## 2020-10-28 RX ORDER — CIPROFLOXACIN HYDROCHLORIDE 3.5 MG/ML
1 SOLUTION/ DROPS TOPICAL 2 TIMES DAILY
Qty: 10 DROP | Refills: 0 | Status: SHIPPED | OUTPATIENT
Start: 2020-10-28 | End: 2020-10-28 | Stop reason: SDUPTHER

## 2020-10-28 RX ORDER — CIPROFLOXACIN HYDROCHLORIDE 3.5 MG/ML
1 SOLUTION/ DROPS TOPICAL 2 TIMES DAILY
Qty: 10 DROP | Refills: 0 | Status: SHIPPED | OUTPATIENT
Start: 2020-10-28 | End: 2020-11-02

## 2020-10-28 NOTE — TELEPHONE ENCOUNTER
Bilateral eyes red, watery, swelling and eyes feel crusty. C/o eye itchy and burning. Reason for Disposition   Patient wants to be seen    Answer Assessment - Initial Assessment Questions  1. ONSET: \"When did the pain start? \" (e.g., minutes, hours, days)      Today    2. TIMING: \"Does the pain come and go, or has it been constant since it started? \" (e.g., constant, intermittent, fleeting)      Constant    3. SEVERITY: \"How bad is the pain? \"   (Scale 1-10; mild, moderate or severe)    - MILD (1-3): doesn't interfere with normal activities     - MODERATE (4-7): interferes with normal activities or awakens from sleep     - SEVERE (8-10): excruciating pain and patient unable to do normal activities      Moderate    4. LOCATION: \"Where does it hurt? \"  (e.g., eyelid, eye, cheekbone)      Eye    5. CAUSE: \"What do you think is causing the pain? \"      Pink eye    6. VISION: \"Do you have blurred vision or changes in your vision? \"       Denies    7. EYE DISCHARGE: \"Is there any discharge (pus) from the eye(s)? \"  If yes, ask: \"What color is it? \"       Denies    8. FEVER: \"Do you have a fever? \" If so, ask: \"What is it, how was it measured, and when did it start? \"       Denies    9. OTHER SYMPTOMS: \"Do you have any other symptoms? \" (e.g., headache, nasal discharge, facial rash)      Denies    10. PREGNANCY: \"Is there any chance you are pregnant? \" \"When was your last menstrual period? \"        denies    Protocols used: EYE PAIN-ADULT-[place]    Patient called pre-service center [facility]) to schedule appointment, with red flag complaint, transferred to RN access for triage. See above questions and answers. Caller talking full sentences without any distress on phone. Discussed disposition and patient agreeable. Discussed potential consequences for not following disposition recommendation. Aware to call back with any concerns or persistent, worsening, or new symptoms develop.       Warm transfer to [facility] scheduling for appointment. Attention Provider: Thank you for allowing me to participate in the care of your patient. The  patient was connected to triage in response to information provided to the ECC. Please do not respond through this encounter as the response is not directed to a shared pool.

## 2021-01-07 RX ORDER — OMEPRAZOLE 10 MG/1
CAPSULE, DELAYED RELEASE ORAL
Qty: 90 CAPSULE | Refills: 0 | Status: SHIPPED | OUTPATIENT
Start: 2021-01-07 | End: 2021-03-30

## 2021-01-07 NOTE — TELEPHONE ENCOUNTER
Medication and Quantity requested: omeprazole (PRILOSEC) 10 MG delayed release capsule    Quantity 90     Last Visit  10/27/20    Pharmacy and phone number updated in EPIC:  Yes Blanca Richardson

## 2021-03-29 NOTE — TELEPHONE ENCOUNTER
Medication:   Requested Prescriptions     Pending Prescriptions Disp Refills    omeprazole (PRILOSEC) 10 MG delayed release capsule [Pharmacy Med Name: OMEPRAZOLE 10 MG Capsule Delayed Release] 90 capsule 0     Sig: TAKE 1 CAPSULE ONE TIME NIGHTLY        Last Filled:  1/7/2021 #90 Refills 0    Patient Phone Number: [phone] (home)     Last appt: 10/27/2020    Return for Medicare Annual Wellness Visit in 1 year. Next appt: Visit date not found    Last OARRS: No flowsheet data found.

## 2021-03-30 RX ORDER — OMEPRAZOLE 10 MG/1
CAPSULE, DELAYED RELEASE ORAL
Qty: 90 CAPSULE | Refills: 0 | Status: SHIPPED | OUTPATIENT
Start: 2021-03-30 | End: 2021-09-16

## 2021-04-28 ENCOUNTER — TELEPHONE (OUTPATIENT)
Dept: FAMILY MEDICINE CLINIC | Age: 21
End: 2021-04-28

## 2021-06-02 ENCOUNTER — VIRTUAL VISIT (OUTPATIENT)
Dept: FAMILY MEDICINE CLINIC | Age: 21
End: 2021-06-02
Payer: MEDICARE

## 2021-06-02 ENCOUNTER — TELEPHONE (OUTPATIENT)
Dept: FAMILY MEDICINE CLINIC | Age: 21
End: 2021-06-02

## 2021-06-02 ENCOUNTER — NURSE TRIAGE (OUTPATIENT)
Dept: OTHER | Facility: CLINIC | Age: 21
End: 2021-06-02

## 2021-06-02 DIAGNOSIS — S16.1XXA ACUTE STRAIN OF NECK MUSCLE, INITIAL ENCOUNTER: Primary | ICD-10-CM

## 2021-06-02 PROCEDURE — 99213 OFFICE O/P EST LOW 20 MIN: CPT | Performed by: FAMILY MEDICINE

## 2021-06-02 PROCEDURE — G8427 DOCREV CUR MEDS BY ELIG CLIN: HCPCS | Performed by: FAMILY MEDICINE

## 2021-06-02 RX ORDER — METHOCARBAMOL 500 MG/1
500 TABLET, FILM COATED ORAL 4 TIMES DAILY
Qty: 40 TABLET | Refills: 0 | Status: SHIPPED | OUTPATIENT
Start: 2021-06-02 | End: 2021-06-12

## 2021-06-02 ASSESSMENT — PATIENT HEALTH QUESTIONNAIRE - PHQ9
SUM OF ALL RESPONSES TO PHQ9 QUESTIONS 1 & 2: 0
1. LITTLE INTEREST OR PLEASURE IN DOING THINGS: 0
SUM OF ALL RESPONSES TO PHQ QUESTIONS 1-9: 0
2. FEELING DOWN, DEPRESSED OR HOPELESS: 0

## 2021-06-02 NOTE — TELEPHONE ENCOUNTER
----- Message from Mayuri Nieves sent at 6/2/2021 12:19 PM EDT -----  Subject: Appointment Request    Reason for Call: Semi-Routine Return from RN Triage    QUESTIONS  Type of Appointment? Established Patient  Reason for appointment request? No appointments available during search  Additional Information for Provider? Return from nurse triage w/ neck and   shoulder pain. Disposition is to be seen within 3 days. ---------------------------------------------------------------------------  --------------  Shannon NARAYAN  What is the best way for the office to contact you? OK to leave message on   voicemail  Preferred Call Back Phone Number? [phone]  ---------------------------------------------------------------------------  --------------  SCRIPT ANSWERS  Patient needs to be seen within 5 days? Yes  Nurse Name? Maggie  Have you been diagnosed with, awaiting test results for, or told that you   are suspected of having COVID-19 (Coronavirus)? (If patient has tested   negative or was tested as a requirement for work, school, or travel and   not based on symptoms, answer no)? No  Do you currently have flu-like symptoms including fever or chills, cough,   shortness of breath, difficulty breathing, or new loss of taste or smell? No  Have you had close contact with someone with COVID-19 in the last 14 days? No  (Service Expert  click yes below to proceed with [employer]ual   Scheduling)?  Yes

## 2021-06-02 NOTE — TELEPHONE ENCOUNTER
Mom on line with patient, shoulder and neck pain right started 2 days numbness. No injury. Reason for Disposition   MODERATE neck pain (e.g., interferes with normal activities like work or school)    Answer Assessment - Initial Assessment Questions  1. ONSET: \"When did the pain begin? \"       About 2 days ago    2. LOCATION: \"Where does it hurt? \"       Right side, between neck and should    3. PATTERN \"Does the pain come and go, or has it been constant since it started? \"       Constant, sometimes gets a little better    4. SEVERITY: \"How bad is the pain? \"  (Scale 1-10; or mild, moderate, severe)    - MILD (1-3): doesn't interfere with normal activities     - MODERATE (4-7): interferes with normal activities or awakens from sleep     - SEVERE (8-10):  excruciating pain, unable to do any normal activities       8/10    5. RADIATION: \"Does the pain go anywhere else, shoot into your arms? \"      No    6. CORD SYMPTOMS: \"Any weakness or numbness of the arms or legs? \"      Weakness only in neck- hurts to move neck    7. CAUSE: \"What do you think is causing the neck pain? \"      Unsure    8. NECK OVERUSE: Vanetta Sicard recent activities that involved turning or twisting the neck? \"      No    9. OTHER SYMPTOMS: \"Do you have any other symptoms? \" (e.g., headache, fever, chest pain, difficulty breathing, neck swelling)      No    10. PREGNANCY: \"Is there any chance you are pregnant? \" \"When was your last menstrual period? \"        No LMP has birthcontrol in arm- a few months ago. Protocols used: NECK PAIN OR STIFFNESS-ADULT-[place]    Received call from [address] [place] [address]-service [facility] with Red Flag Complaint. Brief description of triage: neck shoulder pain    Triage indicates for patient to See PCP in next 3 day      Care advice provided, patient verbalizes understanding; denies any other questions or concerns; instructed to call back for any new or worsening symptoms.     Writer provided warm transfer to [facility] at

## 2021-06-02 NOTE — PROGRESS NOTES
2021    TELEHEALTH EVALUATION -- Audio/Visual (During DCPIS-50 public health emergency)    HPI:    Corey Peabody (:  2000) has requested an audio/video evaluation for the following concern(s):    C/o R sided neck/shoulder pain. Hurts to move neck at all x 2 days. No known injury. Started upon awakening. Has been getting worse since that. Using ibuprofen PRN which is not helping. Has also used icy hot with no improvement. Review of Systems:  Gen:  Denies fever, chills, headaches. No weight loss  HEENT:  Denies cold symptoms, sore throat. CV:  Denies chest pain or tightness, palpitations. Pulm:  Denies shortness of breath, cough. Abd:  Denies abdominal pain, change in bowel habits. Prior to Visit Medications    Medication Sig Taking?  Authorizing Provider   omeprazole (PRILOSEC) 10 MG delayed release capsule TAKE 1 CAPSULE ONE TIME NIGHTLY Yes Shamika Dillon MD   fluticasone (FLONASE) 50 MCG/ACT nasal spray 2 sprays by Each Nostril route daily Yes Shamika Dillon MD   polyethylene glycol (GLYCOLAX) 17 GM/SCOOP powder Take 26 g by mouth daily Yes Shamika Dillon MD   Syringe/Needle, Disp, (SYRINGE 3CC/22GX1\") 22G X 1\" 3 ML MISC FOR MIXING DILUENT AND GROWTH HORMONE MEDICATION ONLY Yes Shamika Dillon MD   Transparent Dressings (TEGADERM + PAD 2\"X2-3/4\") MISC APPLY NEW PATCH TWICE WEEKLY WITH ESTRADIOL PATCH Yes Shamika Dillon MD   norethindrone (MICRONOR) 0.35 MG tablet Take 1 tablet by mouth daily Yes Shamika Dillon MD   magnesium hydroxide (MILK OF MAGNESIA) 400 MG/5ML suspension Take 30 mLs by mouth daily as needed for Constipation Yes Shamika Dillon MD   levothyroxine (SYNTHROID) 150 MCG tablet Take 1 tablet by mouth Daily Yes Shamika Dillon MD   Chloroxylenol, Antiseptic, 0.5 % PADS APPLY ONE PAD TO THE SKIN TWO TIMES A WEEK TO PREP FOR PATCH Yes Shamika Dillon MD   modafinil (PROVIGIL) 100 MG tablet Take 1 tablet (=100 mg) by mouth twice daily, every morning and early afternoon. There should be at least 4 hours in between doses. Yes Historical Provider, MD   Cholecalciferol (VITAMIN D3) 50 MCG (2000 UT) TABS Take 2,000 Units by mouth Yes Historical Provider, MD   acetaminophen (TYLENOL) 325 MG tablet Take 650 mg by mouth Yes Historical Provider, MD   Calcium Carbonate Antacid 1000 MG CHEW Take 1,000 mg by mouth Yes Historical Provider, MD   sertraline (ZOLOFT) 100 MG tablet TAKE TWO TABLETS BY MOUTH EVERY NIGHT AT BEDTIME Yes Historical Provider, MD   estradiol (VIVELLE) 0.1 MG/24HR APPLY ONE PATCH TO SKIN TWICE A WEEK . DO NOT CUT THE PATCH Yes Historical Provider, MD   fluticasone (FLONASE) 50 MCG/ACT nasal spray PLACE TWO SPRAYS IN EACH NOSTRIL ONCE DAILY Yes Historical Provider, MD   ketotifen (ZADITOR) 0.025 % ophthalmic solution Apply 1 drop to eye Yes Historical Provider, MD   Insulin Syringe-Needle U-100 (B-D INS SYR HALF-UNIT .3CC/31G) 31G X 5/16\" 0.3 ML MISC Use as directed to administer growth hormone medication daily Yes Historical Provider, MD   Somatropin 5.8 MG SOLR INJECT 0.4MG SUBCUTANEOUSLY ONCE DAILY. DISCARD VIAL 21 DAYS AFTER RECONSTITUTION. REFRIGERATE. Yes Historical Provider, MD       No past medical history on file. No past surgical history on file. No family history on file. Allergies   Allergen Reactions    Azithromycin Diarrhea    Mosquito (Culex Pipiens) Allergy Skin Test      MOSQUITO BITES - Welts       Social History     Tobacco Use    Smoking status: Smoker, Current Status Unknown    Smokeless tobacco: Current User    Tobacco comment: vape stick   Vaping Use    Vaping Use: Every day   Substance Use Topics    Alcohol use: Yes    Drug use: Never          PHYSICAL EXAMINATION:  Vital Signs: (As obtained by patient/caregiver or practitioner observation)  There were no vitals taken for this visit.   Patient-Reported Vitals 6/2/2021   Patient-Reported Weight -   Patient-Reported Height -   Patient-Reported Systolic 492 Patient-Reported Diastolic 79        Respiratory rate appears normal      Constitutional: Appears well-developed and well-nourished. No apparent distress    Mental status: Alert and awake. Oriented to person/place/time. Able to follow commands    Eyes: EOM normal. Sclera normal. No discharge visible  HENT: Normocephalic, atraumatic. Mouth/Throat: Mucous membranes are moist. External Ears Normal    Neck: No visualized mass   Pulmonary/Chest: Respiratory effort normal.  No visualized signs of difficulty breathing or respiratory distress        Musculoskeletal:  Normal range of motion of neck  Neurological:       No Facial Asymmetry (Cranial nerve 7 motor function) (limited exam to video visit). No gaze palsy       Skin:  No significant exanthematous lesions or discoloration noted on facial skin       MSK: neck ROM impaired by acuity of pain  Psychiatric: Normal Affect. No Hallucinations            ASSESSMENT/PLAN:  1. Acute strain of neck muscle, initial encounter  Supportive care discussed  To PT if no improvement  - methocarbamol (ROBAXIN) 500 MG tablet; Take 1 tablet by mouth 4 times daily for 10 days  Dispense: 40 tablet; Refill: 0      No follow-ups on file. Liliam Easton is a 23 y.o. female being evaluated by a Virtual Visit (video visit) encounter to address concerns as mentioned above. A caregiver was present when appropriate. Due to this being a TeleHealth encounter (During [facility] public health emergency), evaluation of the following organ systems was limited: Vitals/Constitutional/EENT/Resp/CV/GI//MS/Neuro/Skin/Heme-Lymph-Imm. Pursuant to the emergency declaration under the [address] [place] [address], [address] [place] [address] authority and the [employer] Act, this Virtual Visit was conducted with patient's (and/or legal guardian's) consent, to reduce the patient's risk of exposure to COVID-19 and provide necessary medical care.   The

## 2021-08-30 ENCOUNTER — OFFICE VISIT (OUTPATIENT)
Dept: FAMILY MEDICINE CLINIC | Age: 21
End: 2021-08-30
Payer: MEDICARE

## 2021-08-30 VITALS
WEIGHT: 171.2 LBS | BODY MASS INDEX: 34.58 KG/M2 | DIASTOLIC BLOOD PRESSURE: 76 MMHG | OXYGEN SATURATION: 97 % | SYSTOLIC BLOOD PRESSURE: 124 MMHG | HEART RATE: 98 BPM

## 2021-08-30 DIAGNOSIS — M25.561 CHRONIC PAIN OF RIGHT KNEE: ICD-10-CM

## 2021-08-30 DIAGNOSIS — E23.0 FEMALE HYPOGONADOTROPIC HYPOGONADISM (HCC): ICD-10-CM

## 2021-08-30 DIAGNOSIS — G89.29 CHRONIC PAIN OF RIGHT KNEE: ICD-10-CM

## 2021-08-30 DIAGNOSIS — M79.10 MYALGIA: Primary | ICD-10-CM

## 2021-08-30 PROCEDURE — 4004F PT TOBACCO SCREEN RCVD TLK: CPT | Performed by: FAMILY MEDICINE

## 2021-08-30 PROCEDURE — G8417 CALC BMI ABV UP PARAM F/U: HCPCS | Performed by: FAMILY MEDICINE

## 2021-08-30 PROCEDURE — G8427 DOCREV CUR MEDS BY ELIG CLIN: HCPCS | Performed by: FAMILY MEDICINE

## 2021-08-30 PROCEDURE — 99213 OFFICE O/P EST LOW 20 MIN: CPT | Performed by: FAMILY MEDICINE

## 2021-08-30 SDOH — ECONOMIC STABILITY: FOOD INSECURITY: WITHIN THE PAST 12 MONTHS, YOU WORRIED THAT YOUR FOOD WOULD RUN OUT BEFORE YOU GOT MONEY TO BUY MORE.: SOMETIMES TRUE

## 2021-08-30 SDOH — ECONOMIC STABILITY: FOOD INSECURITY: WITHIN THE PAST 12 MONTHS, THE FOOD YOU BOUGHT JUST DIDN'T LAST AND YOU DIDN'T HAVE MONEY TO GET MORE.: SOMETIMES TRUE

## 2021-08-30 ASSESSMENT — SOCIAL DETERMINANTS OF HEALTH (SDOH): HOW HARD IS IT FOR YOU TO PAY FOR THE VERY BASICS LIKE FOOD, HOUSING, MEDICAL CARE, AND HEATING?: HARD

## 2021-08-30 NOTE — PROGRESS NOTES
tablet by mouth Daily (Patient taking differently: Take 135 mcg by mouth Daily ) 30 tablet 1    Chloroxylenol, Antiseptic, 0.5 % PADS APPLY ONE PAD TO THE SKIN TWO TIMES A WEEK TO PREP FOR PATCH 54 each 1    modafinil (PROVIGIL) 100 MG tablet Take 1 tablet (=100 mg) by mouth twice daily, every morning and early afternoon. There should be at least 4 hours in between doses.  Cholecalciferol (VITAMIN D3) 50 MCG (2000 UT) TABS Take 2,000 Units by mouth      Calcium Carbonate Antacid 1000 MG CHEW Take 1,000 mg by mouth      sertraline (ZOLOFT) 100 MG tablet TAKE TWO TABLETS BY MOUTH EVERY NIGHT AT BEDTIME      estradiol (VIVELLE) 0.1 MG/24HR APPLY ONE PATCH TO SKIN TWICE A WEEK . DO NOT CUT THE PATCH      Insulin Syringe-Needle U-100 (B-D INS SYR HALF-UNIT .3CC/31G) 31G X 5/16\" 0.3 ML MISC Use as directed to administer growth hormone medication daily      Somatropin 5.8 MG SOLR INJECT 0.4MG SUBCUTANEOUSLY ONCE DAILY. DISCARD VIAL 21 DAYS AFTER RECONSTITUTION. REFRIGERATE.  acetaminophen (TYLENOL) 325 MG tablet Take 650 mg by mouth (Patient not taking: Reported on 8/30/2021)      fluticasone (FLONASE) 50 MCG/ACT nasal spray PLACE TWO SPRAYS IN EACH NOSTRIL ONCE DAILY (Patient not taking: Reported on 8/30/2021)      ketotifen (ZADITOR) 0.025 % ophthalmic solution Apply 1 drop to eye (Patient not taking: Reported on 8/30/2021)       No current facility-administered medications for this visit. Social History     Tobacco Use    Smoking status: Smoker, Current Status Unknown    Smokeless tobacco: Current User    Tobacco comment: vape stick   Substance Use Topics    Alcohol use: Yes        Objective:     Vitals:    08/30/21 1352   BP: 124/76   Pulse: 98   SpO2: 97%   Weight: 171 lb 3.2 oz (77.7 kg)     Body mass index is 34.58 kg/m².      Wt Readings from Last 3 Encounters:   08/30/21 171 lb 3.2 oz (77.7 kg)   09/17/20 170 lb 9.6 oz (77.4 kg)   12/09/19 159 lb (72.1 kg)     BP Readings from Last 3 Encounters:   08/30/21 124/76   09/17/20 (!) 136/90   12/09/19 120/72       Physical exam:  Constitutional: she is oriented to person, place, and time. she appears well-developed and well-nourished. No distress. Cardiovascular: Normal rate, regular rhythm, normal heart sounds and intact distal pulses. No murmur heard. Pulmonary/Chest: Effort normal and breath sounds normal. No stridor. No respiratory distress. she has no wheezes. she has no rales. sheexhibits no tenderness. Abdominal: Soft. Bowel sounds are normal. she exhibits no distension and no mass. There is no tenderness. There is no rebound and no guarding. Musculoskeletal: instable patella in her right knee. Lymphadenopathy:     she has no cervical adenopathy. Neurological:she is alert and oriented to person, place, and time. she has gross neurological exam normal with normal strength and normal gait  Skin:self inflicted Wounds on her thighs  Psychiatric: she has a normal mood and affect. her   behavior is normal.      Assessment/Plan:   1. Myalgia  - External Referral To Rheumatology    2. 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  Chronic pain of right knee  Mostly patellofemoral syndrome  - [facility] MD, Orthopedic Surgery, Anabela Acosta MD  8/30/2021 2:50 PM

## 2021-09-15 NOTE — TELEPHONE ENCOUNTER
Medication:   Requested Prescriptions     Pending Prescriptions Disp Refills    omeprazole (PRILOSEC) 10 MG delayed release capsule [Pharmacy Med Name: OMEPRAZOLE 10 MG Capsule Delayed Release] 90 capsule 0     Sig: TAKE 1 CAPSULE ONE TIME NIGHTLY        Last Filled:  3/30/2021 90 caps 0 refills     Patient Phone Number: [phone] (home)     Last appt: 8/30/2021   Next appt: Visit date not found    Last OARRS: No flowsheet data found.

## 2021-09-16 RX ORDER — OMEPRAZOLE 10 MG/1
CAPSULE, DELAYED RELEASE ORAL
Qty: 90 CAPSULE | Refills: 0 | Status: SHIPPED | OUTPATIENT
Start: 2021-09-16 | End: 2022-01-20

## 2021-10-04 ENCOUNTER — OFFICE VISIT (OUTPATIENT)
Dept: ORTHOPEDIC SURGERY | Age: 21
End: 2021-10-04
Payer: MEDICARE

## 2021-10-04 VITALS — WEIGHT: 171 LBS | HEIGHT: 59 IN | BODY MASS INDEX: 34.47 KG/M2

## 2021-10-04 DIAGNOSIS — M25.562 LEFT KNEE PAIN, UNSPECIFIED CHRONICITY: ICD-10-CM

## 2021-10-04 DIAGNOSIS — M25.561 RIGHT KNEE PAIN, UNSPECIFIED CHRONICITY: Primary | ICD-10-CM

## 2021-10-04 PROCEDURE — 99204 OFFICE O/P NEW MOD 45 MIN: CPT | Performed by: ORTHOPAEDIC SURGERY

## 2021-10-04 PROCEDURE — G8417 CALC BMI ABV UP PARAM F/U: HCPCS | Performed by: ORTHOPAEDIC SURGERY

## 2021-10-04 PROCEDURE — 4004F PT TOBACCO SCREEN RCVD TLK: CPT | Performed by: ORTHOPAEDIC SURGERY

## 2021-10-04 PROCEDURE — G8484 FLU IMMUNIZE NO ADMIN: HCPCS | Performed by: ORTHOPAEDIC SURGERY

## 2021-10-04 PROCEDURE — G8427 DOCREV CUR MEDS BY ELIG CLIN: HCPCS | Performed by: ORTHOPAEDIC SURGERY

## 2021-10-05 NOTE — PROGRESS NOTES
10/4/2021      Reason for visit:  Bilateral knee pain    History of Present Illness:  Patient is a 23-year-old female who presents for evaluation of her bilateral knees. She reports pain for a few weeks. No traumatic injury. She localized the pain to the anterior and deep aspects of the knees. The pain is made worse with standing, walking, stairs. No swelling. No catching or locking. Medical History:  No past medical history on file. No past surgical history on file. No family history on file. Social History     Socioeconomic History    Marital status: Single     Spouse name: Not on file    Number of children: Not on file    Years of education: Not on file    Highest education level: Not on file   Occupational History    Not on file   Tobacco Use    Smoking status: Smoker, Current Status Unknown    Smokeless tobacco: Current User    Tobacco comment: vape stick   Vaping Use    Vaping Use: Every day   Substance and Sexual Activity    Alcohol use: Yes    Drug use: Never    Sexual activity: Not Currently     Partners: Male     Birth control/protection: Other-see comments   Other Topics Concern    Not on file   Social History Narrative    Not on file     Social Determinants of Health     Financial Resource Strain: High Risk    Difficulty of Paying Living Expenses: Hard   Food Insecurity: Food Insecurity Present    Worried About Running Out of Food in the Last Year: Sometimes true    Nikhil of Food in the Last Year: Sometimes true   Transportation Needs:     Lack of Transportation (Medical):      Lack of Transportation (Non-Medical):    Physical Activity:     Days of Exercise per Week:     Minutes of Exercise per Session:    Stress:     Feeling of Stress :    Social Connections:     Frequency of Communication with Friends and Family:     Frequency of Social Gatherings with Friends and Family:     Attends [employer] Services:     Active Member of Clubs or Organizations:     Attends Club or Organization Meetings:     Marital Status:    Intimate Partner Violence:     Fear of Current or Ex-Partner:     Emotionally Abused:     Physically Abused:     Sexually Abused:       Current Outpatient Medications on File Prior to Visit   Medication Sig Dispense Refill    omeprazole (PRILOSEC) 10 MG delayed release capsule TAKE 1 CAPSULE ONE TIME NIGHTLY 90 capsule 0    fluticasone (FLONASE) 50 MCG/ACT nasal spray 2 sprays by Each Nostril route daily 1 Bottle 5    polyethylene glycol (GLYCOLAX) 17 GM/SCOOP powder Take 26 g by mouth daily 1 Bottle 1    Syringe/Needle, Disp, (SYRINGE 3CC/22GX1\") 22G X 1\" 3 ML MISC FOR MIXING DILUENT AND GROWTH HORMONE MEDICATION ONLY 100 each 1    Transparent Dressings (TEGADERM + PAD 2\"X2-3/4\") MISC APPLY NEW PATCH TWICE WEEKLY WITH ESTRADIOL PATCH 100 each 1    norethindrone (MICRONOR) 0.35 MG tablet Take 1 tablet by mouth daily 30 tablet 1    magnesium hydroxide (MILK OF MAGNESIA) 400 MG/5ML suspension Take 30 mLs by mouth daily as needed for Constipation      levothyroxine (SYNTHROID) 150 MCG tablet Take 1 tablet by mouth Daily (Patient taking differently: Take 135 mcg by mouth Daily ) 30 tablet 1    Chloroxylenol, Antiseptic, 0.5 % PADS APPLY ONE PAD TO THE SKIN TWO TIMES A WEEK TO PREP FOR PATCH 54 each 1    modafinil (PROVIGIL) 100 MG tablet Take 1 tablet (=100 mg) by mouth twice daily, every morning and early afternoon. There should be at least 4 hours in between doses.  Cholecalciferol (VITAMIN D3) 50 MCG (2000 UT) TABS Take 2,000 Units by mouth      acetaminophen (TYLENOL) 325 MG tablet Take 650 mg by mouth (Patient not taking: Reported on 8/30/2021)      Calcium Carbonate Antacid 1000 MG CHEW Take 1,000 mg by mouth      sertraline (ZOLOFT) 100 MG tablet TAKE TWO TABLETS BY MOUTH EVERY NIGHT AT BEDTIME      estradiol (VIVELLE) 0.1 MG/24HR APPLY ONE PATCH TO SKIN TWICE A WEEK .  DO NOT CUT THE PATCH      fluticasone (FLONASE) 50 MCG/ACT nasal spray PLACE TWO SPRAYS IN EACH NOSTRIL ONCE DAILY (Patient not taking: Reported on 8/30/2021)      ketotifen (ZADITOR) 0.025 % ophthalmic solution Apply 1 drop to eye (Patient not taking: Reported on 8/30/2021)      Insulin Syringe-Needle U-100 (B-D INS SYR HALF-UNIT .3CC/31G) 31G X 5/16\" 0.3 ML MISC Use as directed to administer growth hormone medication daily      Somatropin 5.8 MG SOLR INJECT 0.4MG SUBCUTANEOUSLY ONCE DAILY. DISCARD VIAL 21 DAYS AFTER RECONSTITUTION. REFRIGERATE. No current facility-administered medications on file prior to visit. Allergies   Allergen Reactions    Azithromycin Diarrhea    Mosquito (Culex Pipiens) Allergy Skin Test      MOSQUITO BITES - Bety        Review of Systems:  Constitutional: Patient is adequately groomed with no evidence of malnutrition  Mental Status: The patient is oriented to time, place and person. The patient's mood and affect are appropriate. Lymphatic: The lymphatic examination bilaterally reveals all areas to be without enlargement or induration. Vascular: Examination reveals no swelling or calf tenderness. Peripheral pulses are palpable and 2+. Neurological: The patient has good coordination. There is no weakness or sensory deficit. Skin:  Head/Neck: inspection reveals no rashes, ulcerations or lesions. Trunk: inspection reveals no rashes, ulcerations or lesions.     Objective:  Ht 4' 11\" (1.499 m)   Wt 171 lb (77.6 kg)   BMI 34.54 kg/m²      Physical Exam:  The patient is well-appearing and in no apparent distress  Examination of the right and left knee   There is no effusion, no gross deformity or skin changes  Range of motion reveals 0 to 135  neg lachman, negative posterior drawer, no pain or laxity with varus or valgus stress at 0 degrees and 30 degrees of flexion  neg joint line tenderness  5 out of 5 strength throughout distal muscle groups  Sensation is intact to light touch throughout all distributions  There is no calf swelling or tenderness  Palpable DP pulse, brisk cap refill, 2+ symmetric reflexes    Imagin view x-rays of the right and left knees obtained in the office today on 10/4/2021 were reviewed. There is no fracture or dislocation. No other abnormality. Assessment:  Bilateral knee pain. Suspect patellofemoral pain    Plan:  I discussed with the patient the diagnosis and treatment options. We discussed operative and nonoperative management. At this point I do recommend nonoperative management. Nonoperative treatment options include activity modification, anti-inflammatory medications, physical therapy, and injections. We will proceed with physical therapy. The patient will return as needed. If she does remain symptomatic she will call us in the next step would be an MRI. Greater than 45 minutes were spent with this encounter. Time spent included evaluating the patient's chart prior to arrival.  Evaluating the patient in the office including history, physical examination, imaging reviewing, and counseling on next steps. Lastly, time was spent discussing orders with my staff as well as providing documentation in the chart. Suzanne Issa MD            Orthopaedic Surgery Sports Medicine and [address] [facility] and [address] [facility] ([place]      Disclaimer: This note was dictated with voice recognition software. Though review and correction are routine, we apologize for any errors.

## 2021-10-18 ENCOUNTER — HOSPITAL ENCOUNTER (OUTPATIENT)
Dept: PHYSICAL THERAPY | Age: 21
Setting detail: THERAPIES SERIES
Discharge: HOME | End: 2021-10-18

## 2021-10-18 NOTE — FLOWSHEET NOTE
[facility]    Physical Therapy  Cancellation/No-show Note  Patient Name:  Wade Tsai  :  2000   Date:  10/18/2021  Cancelled visits to date: 0  No-shows to date: 0    For today's appointment patient:  []  Cancelled  []  Rescheduled appointment  [x]  No-show     Reason given by patient:  []  Patient ill  []  Conflicting appointment  []  No transportation    []  Conflict with work  [x]  No reason given  []  Other:     Comments:      Phone call information:   []  Phone call made today to patient at _ time at number provided:      []  Patient answered, conversation as follows:    []  Patient did not answer, message left as follows:  []  Phone call not made today  []  Phone call not needed - pt contacted us to cancel and provided reason for cancellation. Electronically signed by:   Angella Musa, PT, PT

## 2021-10-22 ENCOUNTER — HOSPITAL ENCOUNTER (OUTPATIENT)
Dept: PHYSICAL THERAPY | Age: 21
Setting detail: THERAPIES SERIES
Discharge: HOME | End: 2021-10-22
Payer: MEDICARE

## 2021-10-22 PROCEDURE — 97161 PT EVAL LOW COMPLEX 20 MIN: CPT

## 2021-10-22 PROCEDURE — 97110 THERAPEUTIC EXERCISES: CPT

## 2021-10-22 NOTE — PLAN OF CARE
[place] [address]  Phone [phone]   Fax [phone]                                                       Physical Therapy Certification    Dear Referring Practitioner: Dr. Katya Hernandez,    We had the pleasure of evaluating the following patient for physical therapy services at [address] [place] [address]. A summary of our findings can be found in the initial assessment below. This includes our plan of care. If you have any questions or concerns regarding these findings, please do not hesitate to contact me at the office phone number checked above.   Thank you for the referral.       Physician Signature:_______________________________Date:__________________  By signing above (or electronic signature), therapists plan is approved by physician      Patient: Conor Odonnell   : 2000   MRN: [id number]  Referring Physician: Referring Practitioner: Dr. Katya Hernandez      Evaluation Date: 10/22/2021      Medical Diagnosis Information:  Diagnosis: M25.561 M25.562  (B) Knee Pain, Unspecified Chronicity   Treatment Diagnosis: M25.561 M25.562 (B) Knee Pain   LE Strength deficits, Decrease Balance                                         Insurance information: PT Insurance Information: [employer] ([employer])     Precautions/ Contra-indications: Prader-Willi Syndrome, hx of anxiety, depression, reported HTN    C-SSRS Triggered by Intake questionnaire (Past 2 wk assessment):   [x] No, Questionnaire did not trigger screening.   [] Yes, Patient intake triggered further evaluation      [] C-SSRS Screening completed  [] PCP notified via Plan of Care  [] Emergency services notified     Latex Allergy:  [x]NO      []YES  Preferred Language for Healthcare:   [x]English       []other:    SUBJECTIVE: Patient stated complaint: (B) knee pain that has been going on for approximately a couple of months. Pt notes her (R) side started first and then the (L). Pt notes she has a hx of low muscle tone due to another health condition and that she has struggles navigating stairs. Pt does also have chronic low back pain and has had past physical therapy. Pt notes her knee pain is worsened with any bending or straightening motions of her knees. Pt recently saw her orthopaedic surgeon and was diagnosed with (B) patellofemoral pain. Pt denies any popping,catching, or locking. Pt does note a history of falls with her most recent reported at [facility] multiple months ago. Pt states she has a hx of prader-willi syndrome which is why she has lower muscle tone. Pt reports she would like to work but cannot currently.      Relevant Medical History: HTN, anxiety, depression, Prader-Willi Syndrome  Functional Disability Index:LEFS 40% LOF    Pain Scale: 4/10 currently  7/10 at worst  Easing factors:  rest  Provocative factors: walking, squatting, stairs    Type: []Constant   []Intermittent  []Radiating [x]Localized []other:     Numbness/Tingling: None    Occupation/School: not currently working     Living Status/Prior Level of Function: Independent with ADLs and IADLs, Lives with mom    OBJECTIVE:     ROM LEFT RIGHT   HIP Flex [facility] WFL   HIP Abd [facility] WFL   HIP Ext [facility]   HIP IR [facility]   HIP ER [facility] WFL   Knee ext +4 hyperextension +5 hyperextension   Knee Flex 130 130   Ankle PF WNL WNL   Ankle DF WFL WFL   Ankle In [facility] WFL   Ankle Ev WFL    Strength  LEFT RIGHT   HIP Flexors 4 4   HIP Abductors 4- 4-   HIP Ext     Hip ER 4- 4-   Knee EXT (quad) 4+ 4+   Knee Flex (HS) 4+ 4+   Ankle DF 5 5   Ankle PF 5 5   Ankle Inv 5 5   Ankle EV 5 5        Circumference  Mid apex  7 cm prox     NA NA       Balance: Tandem: Significantly increased sway bilaterally, able to stand > 10s       SLS: (L): 3 sec     (R) 7 sec      Reflexes/Sensation:    [x]Dermatomes/Myotomes intact    [x]Reflexes equal and normal bilaterally   []Other:    Joint mobility:    [x]Normal    []Hypo   []Hyper    Palpation:  TTP around inferior patellar pole    Functional Mobility/Transfers: independent    Functional Squat:  Able to squat to 60,  Excessive forward translation of knees, valgus collapse (B)    Posture: in standing: valgus positioning of knees, both knees in hyperextension    Bandages/Dressings/Incisions: NA    Gait: (include devices/WB status)  Increased valgus collapse/positioning of knee, hyperextension noted early after initial contact    Orthopedic Special Tests:  (-) Lachman's  (-) Jeremy's  (-) Varus/Valgus at 0 and 30 deg                       [x] Patient history, allergies, meds reviewed. Medical chart reviewed. See intake form. Review Of Systems (ROS):  [x]Performed Review of systems (Integumentary, CardioPulmonary, Neurological) by intake and observation. Intake form has been scanned into medical record. Patient has been instructed to contact their primary care physician regarding ROS issues if not already being addressed at this time.       Co-morbidities/Complexities (which will affect course of rehabilitation):  []None           Arthritic conditions   []Rheumatoid arthritis (M05.9)  []Osteoarthritis (M19.91)   Cardiovascular conditions   [x]Hypertension (I10)  []Hyperlipidemia (E78.5)  []Angina pectoris (I20)  []Atherosclerosis (I70)   Musculoskeletal conditions   []Disc pathology   []Congenital spine pathologies   []Prior surgical intervention  []Osteoporosis (M81.8)  []Osteopenia (M85.8)   Endocrine conditions   []Hypothyroid (E03.9)  []Hyperthyroid Gastrointestinal conditions   []Constipation (J32.99)   Metabolic conditions   []Morbid obesity (E66.01)  []Diabetes type 1(E10.65) or 2 (E11.65)   []Neuropathy (G60.9)     Pulmonary conditions   []Asthma (J45)  []Coughing   []COPD (J44.9)   Psychological Disorders  [x]Anxiety (F41.9)  [x]Depression (F32.9)   []Other:   [x]Other:   Prader-Willi Syndrome       Barriers to/and or personal factors that will affect rehab potential:              []Age  []Sex              []Motivation/Lack of Motivation                        []Co-Morbidities              [x]Cognitive Function, education/learning barriers              []Environmental, home barriers              []profession/work barriers  []past PT/medical experience  []other:  Justification:     Falls Risk Assessment (30 days):   [] Falls Risk assessed and no intervention required.   [x] Falls Risk assessed and Patient requires intervention due to being higher risk   TUG score (>12s at risk):     [x] Falls education provided, including removal of obstacles/tripping hazards in home         ASSESSMENT:   Functional Impairments:     []Noted lumbar/proximal hip/LE joint hypomobility   []Decreased LE functional ROM   [x]Decreased core/proximal hip strength and neuromuscular control   [x]Decreased LE functional strength   [x]Reduced balance/proprioceptive control   []other:      Functional Activity Limitations (from functional questionnaire and intake)   []Reduced ability to tolerate prolonged functional positions   []Reduced ability or difficulty with changes of positions or transfers between positions   []Reduced ability to maintain good posture and demonstrate good body mechanics with sitting, bending, and lifting   []Reduced ability to sleep   [] Reduced ability or tolerance with driving and/or computer work   []Reduced ability to perform lifting, carrying tasks   [x]Reduced ability to squat   []Reduced ability to forward bend   [x]Reduced ability to ambulate prolonged functional periods/distances/surfaces   [x]Reduced ability to ascend/descend stairs   [x]Reduced ability to run, hop, cut or jump   []other:    Participation Restrictions   []Reduced participation in self care activities   [x]Reduced participation in home management activities   [x]Reduced participation in work activities   [x]Reduced participation in social activities. []Reduced participation in sport/recreation activities. Classification :    []Signs/symptoms consistent with post-surgical status including decreased ROM, strength and function. []Signs/symptoms consistent with joint sprain/strain   [x]Signs/symptoms consistent with patella-femoral syndrome   []Signs/symptoms consistent with knee OA/hip OA   []Signs/symptoms consistent with internal derangement of knee/Hip   []Signs/symptoms consistent with functional hip weakness/NMR control      []Signs/symptoms consistent with tendinitis/tendinosis    []signs/symptoms consistent with pathology which may benefit from Dry needling      []other:      Prognosis/Rehab Potential:      []Excellent   []Good    [x]Fair   []Poor    Tolerance of evaluation/treatment:    []Excellent   [x]Good    []Fair   []Poor    Physical Therapy Evaluation Complexity Justification  [] A history of present problem with:  [] no personal factors and/or comorbidities that impact the plan of care;  []1-2 personal factors and/or comorbidities that impact the plan of care  [x]3 personal factors and/or comorbidities that impact the plan of care  [] An examination of body systems using standardized tests and measures addressing any of the following: body structures and functions (impairments), activity limitations, and/or participation restrictions;:  [] a total of 1-2 or more elements   [] a total of 3 or more elements   [x] a total of 4 or more elements   [] A clinical presentation with:  [x] stable and/or uncomplicated characteristics   [] evolving clinical presentation with changing characteristics  [] unstable and unpredictable characteristics;   [] Clinical decision making of [x] low, [] moderate, [] high complexity using standardized patient assessment instrument and/or measurable assessment of functional outcome.     [x] EVAL (LOW) 03899 (typically 30 minutes face-to-face)  [] EVAL (MOD) 65754 (typically 30 minutes face-to-face)  [] EVAL (HIGH) 68369 (typically 45 minutes face-to-face)  [] RE-EVAL     PLAN:   Frequency/Duration:  2 days per week for 8 Weeks: due to comorbidities as well as involvement of strength/balance deficits  Interventions:  [x]  Therapeutic exercise including: strength training, ROM, for Lower extremity and core   [x]  NMR activation and proprioception for LE, Glutes and Core   [x]  Manual therapy as indicated for LE, Hip and spine to include: Dry Needling/IASTM, STM, PROM, Gr I-IV mobilizations, manipulation. [x] Modalities as needed that may include: thermal agents, E-stim, Biofeedback, US, iontophoresis as indicated  [x] Patient education on joint protection, postural re-education, activity modification, progression of HEP. HEP instruction: Access Code: [email or web address]  URL: [email or web address]  Date: 10/22/2021  Prepared by: Siri Marian    Exercises  Supine Bridge - 1 x daily - 2-3 x weekly - 3 sets - 10 reps  Sidelying Hip Abduction - 1 x daily - 2-3 x weekly - 3 sets - 10 reps      GOALS:  Patient stated goal:Gain strength, decrease pain, work  [] Progressing: [] Met: [] Not Met: [] Adjusted    Therapist goals for Patient:   Short Term Goals: To be achieved in: 2 weeks  1. Independent in HEP and progression per patient tolerance, in order to prevent re-injury. [] Progressing: [] Met: [] Not Met: [] Adjusted  2. Patient will have a decrease in pain to facilitate improvement in movement, function, and ADLs as indicated by Functional Deficits. [] Progressing: [] Met: [] Not Met: [] Adjusted    Long Term Goals: To be achieved in: 8 weeks  1. Disability index score of 20% or less for the LEFS to demonstrate improve with daily function including performance of household chores   [] Progressing: [] Met: [] Not Met: [] Adjusted  2. Patient will demonstrate a 50% or > decrease in (B) knee pain at worst in order to help pt better tolerate walking community distances for normal function.   [] Progressing: [] Met: [] Not Met: [] Adjusted  3. Patient will demonstrate an increase in Strength to 4+/5 or > in all planes in order to improve knee stability for better prolonged performance of daily tasks that require prolonged standing.   [] Progressing: [] Met: [] Not Met: [] Adjusted       Electronically signed by:  Jade Schroeder PT

## 2021-10-22 NOTE — FLOWSHEET NOTE
[facility]    Physical Therapy Treatment Note/ Progress Report:     Date:  10/22/2021    Patient Name:  Loki Aguirre    :  2000  MRN: [id number]  Medical/Treatment Diagnosis Information:  Diagnosis: M25.561 M25.562  (B) Knee Pain, Unspecified Chronicity  Treatment Diagnosis: M25.561 M25.562 (B) Knee Pain   LE Strength deficits, Decrease Balance  Insurance/Certification information:  PT Insurance Information: [employer] ([employer])  Physician Information:  Referring Practitioner: Dr. Laurent President of care signed (Y/N): Pending    Date of Patient follow up with Physician:      Progress Report: [x]  Yes  []  No     Functional Scale: LEFS 40% LOF   Date: 10/22/2021    Date Range for reporting period:  Beginning:  10/22/2021  Endin2021    Progress report due (10 Rx/or 30 days whichever is less):     Recertification due (POC duration/ or 90 days whichever is less): 8 weeks    Visit # Insurance Allowable Auth Needed   1 MN (auth needed) [x]Yes    []No     Pain level:  4/10 currently  7/10 at worst     SUBJECTIVE:  See eval    OBJECTIVE: See eval   Observation:    Test measurements:      RESTRICTIONS/PRECAUTIONS: Pt reported HTN, Prader-Willi Syndrome, Anxiety/Depression    Exercises/Interventions:     Therapeutic Ex x 10' Resistance Sets/sec Reps Notes   Sidelying Abduction  2 10 ea side HEP   Supine Bridge  2 10 HEP cueing for glut activation, neutral spine                                                                           Therapeutic Activities                                                               Manual Intervention       Knee mobs/PROM       Tib/Fem Mobs       Patella Mobs       Ankle mobs                     NMR re-education                                                                          Therapeutic Exercise and NMR EXR  [x] (57658) Provided verbal/tactile cueing for activities related to strengthening, flexibility, endurance, ROM for improvements in LE, proximal hip, and core control with self care, mobility, lifting, ambulation.  [] (33798) Provided verbal/tactile cueing for activities related to improving balance, coordination, kinesthetic sense, posture, motor skill, proprioception  to assist with LE, proximal hip, and core control in self care, mobility, lifting, ambulation and eccentric single leg control. NMR and Therapeutic Activities:    [] (17595 or 15064) Provided verbal/tactile cueing for activities related to improving balance, coordination, kinesthetic sense, posture, motor skill, proprioception and motor activation to allow for proper function of core, proximal hip and LE with self care and ADLs  [] (20074) Gait Re-education- Provided training and instruction to the patient for proper LE, core and proximal hip recruitment and positioning and eccentric body weight control with ambulation re-education including up and down stairs     Home Exercise Program:    [x] (32333) Reviewed/Progressed HEP activities related to strengthening, flexibility, endurance, ROM of core, proximal hip and LE for functional self-care, mobility, lifting and ambulation/stair navigation   [] (74475)Reviewed/Progressed HEP activities related to improving balance, coordination, kinesthetic sense, posture, motor skill, proprioception of core, proximal hip and LE for self care, mobility, lifting, and ambulation/stair navigation      Manual Treatments:  PROM / STM / Oscillations-Mobs:  G-I, II, III, IV (PA's, Inf., Post.)  [] (49834) Provided manual therapy to mobilize LE, proximal hip and/or LS spine soft tissue/joints for the purpose of modulating pain, promoting relaxation,  increasing ROM, reducing/eliminating soft tissue swelling/inflammation/restriction, improving soft tissue extensibility and allowing for proper ROM for normal function with self care, mobility, lifting and ambulation.      Modalities: [] Progression is slowed due to complexities listed. [] Progression has been slowed due to co-morbidities. [x] Plan just implemented, too soon to assess goals progression  [] Other:     ASSESSMENT:  See eval    Return to Play: (if applicable)   []  Stage 1: Intro to Strength   []  Stage 2: Return to Run and Strength   []  Stage 3: Return to Jump and Strength   []  Stage 4: Dynamic Strength and Agility   []  Stage 5: Sport Specific Training     []  Ready to Return to Play, Meets All Above Stages   []  Not Ready for Return to Sports   Comments:            Treatment/Activity Tolerance:  [x] Patient tolerated treatment well [] Patient limited by fatique  [] Patient limited by pain  [] Patient limited by other medical complications  [] Other:     Overall Progression Towards Functional goals/ Treatment Progress Update:  [] Patient is progressing as expected towards functional goals listed. [] Progression is slowed due to complexities/Impairments listed. [] Progression has been slowed due to co-morbidities. [x] Plan just implemented, too soon to assess goals progression <30days   [] Goals require adjustment due to lack of progress  [] Patient is not progressing as expected and requires additional follow up with physician  [] Other    Prognosis for POC: [x] Good [] Fair  [] Poor    Patient requires continued skilled intervention: [x] Yes  [] No        PLAN: See eval  [] Continue per plan of care [] Alter current plan (see comments)  [x] Plan of care initiated [] Hold pending MD visit [] Discharge    Electronically signed by: Luis Garcia PT    Note: If patient does not return for scheduled/recommended follow up visits, this note will serve as a discharge from care along with the most recent update on progress.

## 2021-10-28 ENCOUNTER — HOSPITAL ENCOUNTER (OUTPATIENT)
Dept: PHYSICAL THERAPY | Age: 21
Setting detail: THERAPIES SERIES
Discharge: HOME | End: 2021-10-28
Payer: MEDICARE

## 2021-10-28 PROCEDURE — 97110 THERAPEUTIC EXERCISES: CPT

## 2021-10-28 NOTE — FLOWSHEET NOTE
[facility]    Physical Therapy Treatment Note/ Progress Report:     Date:  10/28/2021    Patient Name:  Buena Hammans    :  2000  MRN: [id number]  Medical/Treatment Diagnosis Information:  Diagnosis: M25.561 M25.562  (B) Knee Pain, Unspecified Chronicity  Treatment Diagnosis: M25.561 M25.562 (B) Knee Pain   LE Strength deficits, Decrease Balance  Insurance/Certification information:  PT Insurance Information: [employer] ([employer])  Physician Information:  Referring Practitioner: Dr. Meron Thomas of care signed (Y/N): Pending    Date of Patient follow up with Physician:      Progress Report: [x]  Yes  []  No     Functional Scale: LEFS 40% LOF   Date: 10/22/2021    Date Range for reporting period:  Beginning:  10/22/2021  Endin2021    Progress report due (10 Rx/or 30 days whichever is less):     Recertification due (POC duration/ or 90 days whichever is less): 8 weeks    Visit # Insurance Allowable Auth Needed   1 MN (auth needed) [x]Yes    []No     Pain level:  4/10 currently  7/10 at worst     SUBJECTIVE:  Pt reports knees feel achy and stiff, feel a little maybe since last session. Pt reports HEP compliance.      OBJECTIVE: See eval   Observation:    Test measurements:      RESTRICTIONS/PRECAUTIONS: Pt reported HTN, Prader-Willi Syndrome, Anxiety/Depression    Exercises/Interventions:     Therapeutic Ex x 40' Resistance Sets/sec Reps Notes   Sidelying Abduction  2 10 ea side HEP   Supine Bridge  2 10 HEP cueing for glut activation, neutral spine   S/L clamshells No resistance 2 10    SLR flex  2 10 Cues to avoid knee hyperextension    Bike  4'  For ROM/mobility                                                      Therapeutic Activities                                                               Manual Intervention       Knee mobs/PROM       Tib/Fem Mobs       Patella Mobs       Ankle mobs                     NMR re-education Therapeutic Exercise and NMR EXR  [x] (76718) Provided verbal/tactile cueing for activities related to strengthening, flexibility, endurance, ROM for improvements in LE, proximal hip, and core control with self care, mobility, lifting, ambulation.  [] (88914) Provided verbal/tactile cueing for activities related to improving balance, coordination, kinesthetic sense, posture, motor skill, proprioception  to assist with LE, proximal hip, and core control in self care, mobility, lifting, ambulation and eccentric single leg control.      NMR and Therapeutic Activities:    [] (24413 or 67974) Provided verbal/tactile cueing for activities related to improving balance, coordination, kinesthetic sense, posture, motor skill, proprioception and motor activation to allow for proper function of core, proximal hip and LE with self care and ADLs  [] (54322) Gait Re-education- Provided training and instruction to the patient for proper LE, core and proximal hip recruitment and positioning and eccentric body weight control with ambulation re-education including up and down stairs     Home Exercise Program:    [x] (66835) Reviewed/Progressed HEP activities related to strengthening, flexibility, endurance, ROM of core, proximal hip and LE for functional self-care, mobility, lifting and ambulation/stair navigation   [] (51808)Reviewed/Progressed HEP activities related to improving balance, coordination, kinesthetic sense, posture, motor skill, proprioception of core, proximal hip and LE for self care, mobility, lifting, and ambulation/stair navigation      Manual Treatments:  PROM / STM / Oscillations-Mobs:  G-I, II, III, IV (PA's, Inf., Post.)  [] (27326) Provided manual therapy to mobilize LE, proximal hip and/or LS spine soft tissue/joints for the purpose of modulating pain, promoting relaxation,  increasing ROM, reducing/eliminating soft tissue swelling/inflammation/restriction, improving soft tissue extensibility and allowing for proper ROM for normal function with self care, mobility, lifting and ambulation. Modalities:  deferred    Charges:  Timed Code Treatment Minutes: 40'   Total Treatment Minutes: 36'       [] EVAL (LOW) 455 1011 (typically 20 minutes face-to-face)  [] EVAL (MOD) 80481 (typically 30 minutes face-to-face)  [] EVAL (HIGH) 31686 (typically 45 minutes face-to-face)  [] RE-EVAL     [x] JOHNSON(94339) x  3   [] IONTO (50584)  [] NMR (28639) x     [] VASO (33137)  [] Manual (13473) x     [] Other:  [] TA (54640)x     [] Mech Traction (68877)  [] ES(attended) (38581)     [] ES (un) (85213): If BWC Please Indicate Time In/Out and Total Minutes  CPT Code Time in Time out Total Min                                           GOALS:  Patient stated goal:Gain strength, decrease pain, work  [] Progressing: [] Met: [] Not Met: [] Adjusted    Therapist goals for Patient:   Short Term Goals: To be achieved in: 2 weeks  1. Independent in HEP and progression per patient tolerance, in order to prevent re-injury. [] Progressing: [] Met: [] Not Met: [] Adjusted  2. Patient will have a decrease in pain to facilitate improvement in movement, function, and ADLs as indicated by Functional Deficits. [] Progressing: [] Met: [] Not Met: [] Adjusted    Long Term Goals: To be achieved in: 8 weeks  1. Disability index score of 20% or less for the LEFS to demonstrate improve with daily function including performance of household chores   [] Progressing: [] Met: [x] Not Met: [] Adjusted  2. Patient will demonstrate a 50% or > decrease in (B) knee pain at worst in order to help pt better tolerate walking community distances for normal function. [] Progressing: [] Met: [] Not Met: [] Adjusted  3.  Patient will demonstrate an increase in Strength to 4+/5 or > in all planes in order to improve knee stability for better prolonged performance of daily tasks that require prolonged standing. [] Progressing: [] Met: [] Not Met: [] Adjusted    Progression Towards Functional goals:  [] Patient is progressing as expected towards functional goals listed. [] Progression is slowed due to complexities listed. [] Progression has been slowed due to co-morbidities. [x] Plan just implemented, too soon to assess goals progression  [] Other:     ASSESSMENT:  Pt requires frequent verbal cues with exercise progressions today to maintain neutral spine, especially with sidelying exercises. Pt requires frequent tactile cues with supine SLR flexion to decrease hip IR compensation and to avoid knee hyperextension for appropriate quad activation. Pt was fatigued at end of session today. Added bike for mobility with slight discomfort noted after 4 mins, so stopped bike at 4 mins. Return to Play: (if applicable)   []  Stage 1: Intro to Strength   []  Stage 2: Return to Run and Strength   []  Stage 3: Return to Jump and Strength   []  Stage 4: Dynamic Strength and Agility   []  Stage 5: Sport Specific Training     []  Ready to Return to Play, Meets All Above Stages   []  Not Ready for Return to Sports   Comments:            Treatment/Activity Tolerance:  [] Patient tolerated treatment well [x] Patient limited by fatique  [] Patient limited by pain  [] Patient limited by other medical complications  [] Other:     Overall Progression Towards Functional goals/ Treatment Progress Update:  [] Patient is progressing as expected towards functional goals listed. [] Progression is slowed due to complexities/Impairments listed. [] Progression has been slowed due to co-morbidities.   [x] Plan just implemented, too soon to assess goals progression <30days   [] Goals require adjustment due to lack of progress  [] Patient is not progressing as expected and requires additional follow up with physician  [] Other    Prognosis for POC: [x] Good [] Fair  [] Poor    Patient requires continued skilled intervention: [x] Yes  [] No        PLAN: Assess tolerance to exercise progressions. [x] Continue per plan of care [] Alter current plan (see comments)  [] Plan of care initiated [] Hold pending MD visit [] Discharge    Electronically signed by: Giacomo Crowder PT    Note: If patient does not return for scheduled/recommended follow up visits, this note will serve as a discharge from care along with the most recent update on progress.

## 2021-11-05 ENCOUNTER — HOSPITAL ENCOUNTER (OUTPATIENT)
Dept: PHYSICAL THERAPY | Age: 21
Setting detail: THERAPIES SERIES
Discharge: HOME | End: 2021-11-05
Payer: MEDICARE

## 2021-11-05 PROCEDURE — 97110 THERAPEUTIC EXERCISES: CPT

## 2021-11-05 PROCEDURE — 97112 NEUROMUSCULAR REEDUCATION: CPT

## 2021-11-05 NOTE — FLOWSHEET NOTE
[facility]    Physical Therapy Treatment Note/ Progress Report: .    Date:  2021    Patient Name:  Pacheco Drake    :  2000  MRN: [id number]  Medical/Treatment Diagnosis Information:  Diagnosis: M25.561 M25.562  (B) Knee Pain, Unspecified Chronicity  Treatment Diagnosis: M25.561 M25.562 (B) Knee Pain   LE Strength deficits, Decrease Balance  Insurance/Certification information:  PT Insurance Information: [employer] ([employer])  Physician Information:  Referring Practitioner: Dr. Dexter Plant of care signed (Y/N): Pending    Date of Patient follow up with Physician:      Progress Report: [x]  Yes  []  No     Functional Scale: LEFS 40% LOF   Date: 10/22/2021    Date Range for reporting period:  Beginning:  10/22/2021  Endin2021    Progress report due (10 Rx/or 30 days whichever is less):     Recertification due (POC duration/ or 90 days whichever is less): 8 weeks    Visit # Insurance Allowable Auth Needed   3 MN (auth needed) [x]Yes    []No     Pain level:     SUBJECTIVE:  Pt arrived to her appointment 15 min late today. Pt states she is now participating in basketball. Pt denies any current pain or issue after last session.     OBJECTIVE: 2021   Observation:    Test measurements:      RESTRICTIONS/PRECAUTIONS: Pt reported HTN, Prader-Willi Syndrome, Anxiety/Depression    Exercises/Interventions:     Therapeutic Ex x 20' Resistance Sets/sec Reps Notes   Sidelying Abduction  2 10 ea side HEP Held   Supine Bridge  2 10 HEP cueing for glut activation, neutral spine   S/L clamshells Yellow 2 10 ea ^ 11/5   SLR flex  2 10 Cues to avoid knee hyperextension    Bike  4'  For ROM/mobility   Leg Press DL 60# 3 10 Range: 90-10 Added                                                Therapeutic Activities                                                               Manual Intervention       Knee mobs/PROM       Tib/Fem Mobs Patella Mobs       Ankle mobs                     NMR re-education x 10'       SLS @ 1/2 wall  10\" 3 ea Added 11/5   Retro Stepdowns 4\" 2 10 ea Added 11/5 Cueing for eccentric knee control and balance                                                        Therapeutic Exercise and NMR EXR  [x] (40254) Provided verbal/tactile cueing for activities related to strengthening, flexibility, endurance, ROM for improvements in LE, proximal hip, and core control with self care, mobility, lifting, ambulation.  [] (78367) Provided verbal/tactile cueing for activities related to improving balance, coordination, kinesthetic sense, posture, motor skill, proprioception  to assist with LE, proximal hip, and core control in self care, mobility, lifting, ambulation and eccentric single leg control.      NMR and Therapeutic Activities:    [] (86048 or 19957) Provided verbal/tactile cueing for activities related to improving balance, coordination, kinesthetic sense, posture, motor skill, proprioception and motor activation to allow for proper function of core, proximal hip and LE with self care and ADLs  [] (37129) Gait Re-education- Provided training and instruction to the patient for proper LE, core and proximal hip recruitment and positioning and eccentric body weight control with ambulation re-education including up and down stairs     Home Exercise Program:    [x] (47825) Reviewed/Progressed HEP activities related to strengthening, flexibility, endurance, ROM of core, proximal hip and LE for functional self-care, mobility, lifting and ambulation/stair navigation   [] (92267)Reviewed/Progressed HEP activities related to improving balance, coordination, kinesthetic sense, posture, motor skill, proprioception of core, proximal hip and LE for self care, mobility, lifting, and ambulation/stair navigation      Manual Treatments:  PROM / STM / Oscillations-Mobs:  G-I, II, III, IV (PA's, Inf., Post.)  [] (83091) Provided manual therapy to mobilize LE, proximal hip and/or LS spine soft tissue/joints for the purpose of modulating pain, promoting relaxation,  increasing ROM, reducing/eliminating soft tissue swelling/inflammation/restriction, improving soft tissue extensibility and allowing for proper ROM for normal function with self care, mobility, lifting and ambulation. Modalities:  deferred    Charges:  Timed Code Treatment Minutes: 30'   Total Treatment Minutes: 35'       [] EVAL (LOW) 455 1011 (typically 20 minutes face-to-face)  [] EVAL (MOD) 21557 (typically 30 minutes face-to-face)  [] EVAL (HIGH) 22867 (typically 45 minutes face-to-face)  [] RE-EVAL     [x] RQ(85902) x 1   [] IONTO (25095)  [x] NMR (10892) x1     [] VASO (80471)  [] Manual (41135) x     [] Other:  [] TA (61812)x     [] Mech Traction (06910)  [] ES(attended) (69997)     [] ES (un) (10744): If BWC Please Indicate Time In/Out and Total Minutes  CPT Code Time in Time out Total Min                                           GOALS:  Patient stated goal:Gain strength, decrease pain, work  [] Progressing: [] Met: [] Not Met: [] Adjusted    Therapist goals for Patient:   Short Term Goals: To be achieved in: 2 weeks  1. Independent in HEP and progression per patient tolerance, in order to prevent re-injury. [] Progressing: [] Met: [] Not Met: [] Adjusted  2. Patient will have a decrease in pain to facilitate improvement in movement, function, and ADLs as indicated by Functional Deficits. [] Progressing: [] Met: [] Not Met: [] Adjusted    Long Term Goals: To be achieved in: 8 weeks  1. Disability index score of 20% or less for the LEFS to demonstrate improve with daily function including performance of household chores   [] Progressing: [] Met: [x] Not Met: [] Adjusted  2. Patient will demonstrate a 50% or > decrease in (B) knee pain at worst in order to help pt better tolerate walking community distances for normal function.   [] Progressing: [] Met: [] Not Met: [] Adjusted  3. Patient will demonstrate an increase in Strength to 4+/5 or > in all planes in order to improve knee stability for better prolonged performance of daily tasks that require prolonged standing. [] Progressing: [] Met: [] Not Met: [] Adjusted    Progression Towards Functional goals:  [] Patient is progressing as expected towards functional goals listed. [] Progression is slowed due to complexities listed. [] Progression has been slowed due to co-morbidities. [x] Plan just implemented, too soon to assess goals progression  [] Other:     ASSESSMENT: Session shorted due to pt late arrival. Pt fatigues quickly and does require frequent breaks. However, pt tolerate additional exercises without any knee pain. Pt challenged with eccentric control. Return to Play: (if applicable)   []  Stage 1: Intro to Strength   []  Stage 2: Return to Run and Strength   []  Stage 3: Return to Jump and Strength   []  Stage 4: Dynamic Strength and Agility   []  Stage 5: Sport Specific Training     []  Ready to Return to Play, Meets All Above Stages   []  Not Ready for Return to Sports   Comments:            Treatment/Activity Tolerance:  [] Patient tolerated treatment well [x] Patient limited by fatique  [] Patient limited by pain  [] Patient limited by other medical complications  [] Other:     Overall Progression Towards Functional goals/ Treatment Progress Update:  [] Patient is progressing as expected towards functional goals listed. [] Progression is slowed due to complexities/Impairments listed. [] Progression has been slowed due to co-morbidities.   [x] Plan just implemented, too soon to assess goals progression <30days   [] Goals require adjustment due to lack of progress  [] Patient is not progressing as expected and requires additional follow up with physician  [] Other    Prognosis for POC: [x] Good [] Fair  [] Poor    Patient requires continued skilled intervention: [x] Yes  [] No        PLAN: Assess tolerance to exercise progressions. [x] Continue per plan of care [] Alter current plan (see comments)  [] Plan of care initiated [] Hold pending MD visit [] Discharge    Electronically signed by: Dayan Davidson PT    Note: If patient does not return for scheduled/recommended follow up visits, this note will serve as a discharge from care along with the most recent update on progress.

## 2021-11-10 ENCOUNTER — HOSPITAL ENCOUNTER (OUTPATIENT)
Dept: PHYSICAL THERAPY | Age: 21
Setting detail: THERAPIES SERIES
Discharge: HOME | End: 2021-11-10
Payer: MEDICARE

## 2021-11-10 PROCEDURE — 97110 THERAPEUTIC EXERCISES: CPT

## 2021-11-10 PROCEDURE — 97112 NEUROMUSCULAR REEDUCATION: CPT

## 2021-11-10 NOTE — FLOWSHEET NOTE
[facility]    Physical Therapy Treatment Note/ Progress Report: .    Date:  11/10/2021    Patient Name:  Aspen Vogel    :  2000  MRN: [id number]  Medical/Treatment Diagnosis Information:  Diagnosis: M25.561 M25.562  (B) Knee Pain, Unspecified Chronicity  Treatment Diagnosis: M25.561 M25.562 (B) Knee Pain   LE Strength deficits, Decrease Balance  Insurance/Certification information:  PT Insurance Information: [employer] ([employer])  Physician Information:  Referring Practitioner: Dr. Juan Bender of care signed (Y/N): Pending    Date of Patient follow up with Physician:      Progress Report: [x]  Yes  []  No     Functional Scale: LEFS 40% LOF   Date: 10/22/2021    Date Range for reporting period:  Beginning:  10/22/2021  Endin2021    Progress report due (10 Rx/or 30 days whichever is less):     Recertification due (POC duration/ or 90 days whichever is less): 8 weeks    Visit # Insurance Allowable Auth Needed   3 16 visits through 2021 [x]Yes    []No     Pain level: 1/10 medial portion of (R) knee    0/10 (L) knee    SUBJECTIVE:  Pt arrived to her appointment 15 min late today. Pt states feeling that her knee pain seems to be improving. Pt continues to participate in basketball.     OBJECTIVE: 2021   Observation:    Test measurements:      Strength:  Quads: (R) 4+/5 (L): 5/5  Hip Flexion: (R): 5/5 (L): 5/5    RESTRICTIONS/PRECAUTIONS: Pt reported HTN, Prader-Willi Syndrome, Anxiety/Depression    Exercises/Interventions:     Therapeutic Ex x 21' Resistance Sets/sec Reps Notes   Sidelying Abduction  2 10 ea side HEP Held   Supine Bridge  2 10 HEP cueing for glut activation, neutral spine   S/L clamshells Yellow 2 10 ea ^  Held   SLR flex  2 10 Cues to avoid knee hyperextension    Bike  4'  For ROM/mobility   Leg Press DL 60# 3 10 Range: 90-10 Added  Therapeutic Activities                                                               Manual Intervention       Knee mobs/PROM       Tib/Fem Mobs       Patella Mobs       Ankle mobs                     NMR re-education x 10'       SLS @ 1/2 wall  10\" 3 ea Added 11/5 Held   Retro Stepdowns 4\" 2 10 ea Added 11/5 Cueing for eccentric knee control and balance   Lateral Stepping maroon 3 laps 10 steps down/back Added 11/10 cueing for                                             Access Code: [email or web address]  URL: [email or web address]  Date: 11/10/2021  Prepared by: Royce [place]    Exercises  Supine Bridge - 1 x daily - 2-3 x weekly - 3 sets - 10 reps  Sidelying Hip Abduction - 1 x daily - 2-3 x weekly - 3 sets - 10 reps  Supine Active Straight Leg Raise - 1 x daily - 3 x weekly - 3 sets - 10 reps      Therapeutic Exercise and NMR EXR  [x] (50260) Provided verbal/tactile cueing for activities related to strengthening, flexibility, endurance, ROM for improvements in LE, proximal hip, and core control with self care, mobility, lifting, ambulation.  [] (56370) Provided verbal/tactile cueing for activities related to improving balance, coordination, kinesthetic sense, posture, motor skill, proprioception  to assist with LE, proximal hip, and core control in self care, mobility, lifting, ambulation and eccentric single leg control.      NMR and Therapeutic Activities:    [] (69016 or 20054) Provided verbal/tactile cueing for activities related to improving balance, coordination, kinesthetic sense, posture, motor skill, proprioception and motor activation to allow for proper function of core, proximal hip and LE with self care and ADLs  [] (98858) Gait Re-education- Provided training and instruction to the patient for proper LE, core and proximal hip recruitment and positioning and eccentric body weight control with ambulation re-education including up and down stairs     Home Exercise Program:    [x] (82489) Reviewed/Progressed HEP activities related to strengthening, flexibility, endurance, ROM of core, proximal hip and LE for functional self-care, mobility, lifting and ambulation/stair navigation   [] (74249)Reviewed/Progressed HEP activities related to improving balance, coordination, kinesthetic sense, posture, motor skill, proprioception of core, proximal hip and LE for self care, mobility, lifting, and ambulation/stair navigation      Manual Treatments:  PROM / STM / Oscillations-Mobs:  G-I, II, III, IV (PA's, Inf., Post.)  [] (99346) Provided manual therapy to mobilize LE, proximal hip and/or LS spine soft tissue/joints for the purpose of modulating pain, promoting relaxation,  increasing ROM, reducing/eliminating soft tissue swelling/inflammation/restriction, improving soft tissue extensibility and allowing for proper ROM for normal function with self care, mobility, lifting and ambulation. Modalities:  deferred    Charges:  Timed Code Treatment Minutes: 31'   Total Treatment Minutes: 32'       [] EVAL (LOW) 455 1011 (typically 20 minutes face-to-face)  [] EVAL (MOD) 48096 (typically 30 minutes face-to-face)  [] EVAL (HIGH) 78368 (typically 45 minutes face-to-face)  [] RE-EVAL     [x] EL(09390) x 1   [] IONTO (88766)  [x] NMR (55558) x1     [] VASO (81044)  [] Manual (54292) x     [] Other:  [] TA (47731)x     [] Mech Traction (74280)  [] ES(attended) (39844)     [] ES (un) (39587): If BW Please Indicate Time In/Out and Total Minutes  CPT Code Time in Time out Total Min                                           GOALS:  Patient stated goal:Gain strength, decrease pain, work  [] Progressing: [] Met: [] Not Met: [] Adjusted    Therapist goals for Patient:   Short Term Goals: To be achieved in: 2 weeks  1. Independent in HEP and progression per patient tolerance, in order to prevent re-injury. [] Progressing: [] Met: [] Not Met: [] Adjusted  2.  Patient will have a decrease in pain to facilitate improvement in movement, function, and ADLs as indicated by Functional Deficits. [] Progressing: [] Met: [] Not Met: [] Adjusted    Long Term Goals: To be achieved in: 8 weeks  1. Disability index score of 20% or less for the LEFS to demonstrate improve with daily function including performance of household chores   [] Progressing: [] Met: [x] Not Met: [] Adjusted  2. Patient will demonstrate a 50% or > decrease in (B) knee pain at worst in order to help pt better tolerate walking community distances for normal function. [] Progressing: [] Met: [] Not Met: [] Adjusted  3. Patient will demonstrate an increase in Strength to 4+/5 or > in all planes in order to improve knee stability for better prolonged performance of daily tasks that require prolonged standing. [] Progressing: [] Met: [] Not Met: [] Adjusted    Progression Towards Functional goals:  [x] Patient is progressing as expected towards functional goals listed. [] Progression is slowed due to complexities listed. [] Progression has been slowed due to co-morbidities. [] Plan just implemented, too soon to assess goals progression  [] Other:     ASSESSMENT: Treatment session once again shortened due to pt arriving 15+ min late to appointment. Pt does demonstrate improved strength/force in her (L) quad as well as both hip flexors. Progressed HEP this date. Pt demonstrated increased difficulty on the (R) this date with exercise compared to the (L). Pt continues to participate in basketball without any issue. Reviewed appointment times with pt and emphasized the importance of arriving on time.     Return to Play: (if applicable)   []  Stage 1: Intro to Strength   []  Stage 2: Return to Run and Strength   []  Stage 3: Return to Jump and Strength   []  Stage 4: Dynamic Strength and Agility   []  Stage 5: Sport Specific Training     []  Ready to Return to Play, Meets All Above Stages   []  Not Ready for Return to Sports   Comments:            Treatment/Activity Tolerance:  [] Patient tolerated treatment well [x] Patient limited by fatique  [] Patient limited by pain  [] Patient limited by other medical complications  [] Other:     Overall Progression Towards Functional goals/ Treatment Progress Update:  [] Patient is progressing as expected towards functional goals listed. [] Progression is slowed due to complexities/Impairments listed. [] Progression has been slowed due to co-morbidities. [x] Plan just implemented, too soon to assess goals progression <30days   [] Goals require adjustment due to lack of progress  [] Patient is not progressing as expected and requires additional follow up with physician  [] Other    Prognosis for POC: [x] Good [] Fair  [] Poor    Patient requires continued skilled intervention: [x] Yes  [] No        PLAN: Assess tolerance to exercise progressions. [x] Continue per plan of care [] Alter current plan (see comments)  [] Plan of care initiated [] Hold pending MD visit [] Discharge    Electronically signed by: Kelsey Lerma PT    Note: If patient does not return for scheduled/recommended follow up visits, this note will serve as a discharge from care along with the most recent update on progress.

## 2021-11-12 ENCOUNTER — HOSPITAL ENCOUNTER (OUTPATIENT)
Dept: PHYSICAL THERAPY | Age: 21
Setting detail: THERAPIES SERIES
Discharge: HOME | End: 2021-11-12
Payer: MEDICARE

## 2021-11-12 PROCEDURE — 97112 NEUROMUSCULAR REEDUCATION: CPT

## 2021-11-12 PROCEDURE — 97110 THERAPEUTIC EXERCISES: CPT

## 2021-11-12 PROCEDURE — 97140 MANUAL THERAPY 1/> REGIONS: CPT

## 2021-11-12 NOTE — FLOWSHEET NOTE
[facility]    Physical Therapy Treatment Note/ Progress Report: .    Date:  2021    Patient Name:  Buena Hammans    :  2000  MRN: [id number]  Medical/Treatment Diagnosis Information:  Diagnosis: M25.561 M25.562  (B) Knee Pain, Unspecified Chronicity  Treatment Diagnosis: M25.561 M25.562 (B) Knee Pain   LE Strength deficits, Decrease Balance  Insurance/Certification information:  PT Insurance Information: [employer] ([employer])  Physician Information:  Referring Practitioner: Dr. Meron Thomas of care signed (Y/N): Y    Date of Patient follow up with Physician:      Progress Report: []  Yes  [x]  No     Functional Scale: LEFS 40% LOF   Date: 10/22/2021    Date Range for reporting period:  Beginning:  10/22/2021  Endin2021    Progress report due (10 Rx/or 30 days whichever is less):     Recertification due (POC duration/ or 90 days whichever is less): 8 weeks    Visit # Insurance Allowable Auth Needed   4 16 visits through 2021 [x]Yes    []No     Pain level: 0/10 currently    SUBJECTIVE:  Pt reports playing basketball Wednesday without issue. Pt states she ran and jumped without any significant knee pain. Pt states she can still have difficulty navigating stairs at times due to her low muscle tone.     OBJECTIVE: 2021   Observation:    Test measurements:      Strength:  Quads: (R) 4+/5 (L): 5/5  Hip Flexion: (R): 5/5 (L): 5/5    RESTRICTIONS/PRECAUTIONS: Pt reported HTN, Prader-Willi Syndrome, Anxiety/Depression    Exercises/Interventions:     Therapeutic Ex x 24' Resistance Sets/sec Reps Notes   Sidelying Abduction  2 10 ea side HEP Held   Supine Bridge  2 10 HEP cueing for glut activation, neutral spine   S/L clamshells Yellow 2 10 ea ^ 11/5 Held   SLR flex  2 10 Cues to avoid knee hyperextension    Bike  4'  For ROM/mobility   Leg Press DL 70# 2 10 Range: 90-10 Added 11/5    Standing ER/abd into [facility] 5\" 10 ea way Added 11/12                                        Therapeutic Activities                                                               Manual Intervention       Knee mobs/PROM       Tib/Fem Mobs       Patella Mobs       Ankle mobs                     NMR re-education x 18'       SLS @ 1/2 wall  10\" 3 ea Added 11/5 Held   Retro Stepdowns 4\" 2 10 ea Added 11/5 Cueing for eccentric knee control and balance   Lateral Stepping maroon 3 laps 10 steps down/back Added 11/10 cueing for   Fwd Steps 6\" 2 10 ea Added 11/12 cueing for                                      Access Code: [email or web address]  URL: [email or web address]  Date: 11/10/2021  Prepared by: Phan Troy    Exercises  Supine Bridge - 1 x daily - 2-3 x weekly - 3 sets - 10 reps  Sidelying Hip Abduction - 1 x daily - 2-3 x weekly - 3 sets - 10 reps  Supine Active Straight Leg Raise - 1 x daily - 3 x weekly - 3 sets - 10 reps      Therapeutic Exercise and NMR EXR  [x] (16058) Provided verbal/tactile cueing for activities related to strengthening, flexibility, endurance, ROM for improvements in LE, proximal hip, and core control with self care, mobility, lifting, ambulation.  [] (86559) Provided verbal/tactile cueing for activities related to improving balance, coordination, kinesthetic sense, posture, motor skill, proprioception  to assist with LE, proximal hip, and core control in self care, mobility, lifting, ambulation and eccentric single leg control.      NMR and Therapeutic Activities:    [x] (52563 or 26748) Provided verbal/tactile cueing for activities related to improving balance, coordination, kinesthetic sense, posture, motor skill, proprioception and motor activation to allow for proper function of core, proximal hip and LE with self care and ADLs  [] (08185) Gait Re-education- Provided training and instruction to the patient for proper LE, core and proximal hip recruitment and positioning and eccentric body weight control with ambulation re-education including up and down stairs     Home Exercise Program:    [x] (74449) Reviewed/Progressed HEP activities related to strengthening, flexibility, endurance, ROM of core, proximal hip and LE for functional self-care, mobility, lifting and ambulation/stair navigation   [] (34675)Reviewed/Progressed HEP activities related to improving balance, coordination, kinesthetic sense, posture, motor skill, proprioception of core, proximal hip and LE for self care, mobility, lifting, and ambulation/stair navigation      Manual Treatments:  PROM / STM / Oscillations-Mobs:  G-I, II, III, IV (PA's, Inf., Post.)  [] (42621) Provided manual therapy to mobilize LE, proximal hip and/or LS spine soft tissue/joints for the purpose of modulating pain, promoting relaxation,  increasing ROM, reducing/eliminating soft tissue swelling/inflammation/restriction, improving soft tissue extensibility and allowing for proper ROM for normal function with self care, mobility, lifting and ambulation. Modalities:  deferred    Charges:  Timed Code Treatment Minutes: 43'   Total Treatment Minutes: 39'       [] EVAL (LOW) 455 1011 (typically 20 minutes face-to-face)  [] EVAL (MOD) 71997 (typically 30 minutes face-to-face)  [] EVAL (HIGH) 68809 (typically 45 minutes face-to-face)  [] RE-EVAL     [x] CJ(36853) x 2  [] IONTO (16594)  [x] NMR (57532) x1     [] VASO (39931)  [] Manual (10117) x     [] Other:  [] TA (46157)x     [] Mech Traction (99113)  [] ES(attended) (12300)     [] ES (un) (64473): If BWC Please Indicate Time In/Out and Total Minutes  CPT Code Time in Time out Total Min                                           GOALS:  Patient stated goal:Gain strength, decrease pain, work  [] Progressing: [] Met: [] Not Met: [] Adjusted    Therapist goals for Patient:   Short Term Goals: To be achieved in: 2 weeks  1. Independent in HEP and progression per patient tolerance, in order to prevent re-injury.    [] Progressing: [] Met: [] Not Met: [] Adjusted  2. Patient will have a decrease in pain to facilitate improvement in movement, function, and ADLs as indicated by Functional Deficits. [] Progressing: [] Met: [] Not Met: [] Adjusted    Long Term Goals: To be achieved in: 8 weeks  1. Disability index score of 20% or less for the LEFS to demonstrate improve with daily function including performance of household chores   [] Progressing: [] Met: [x] Not Met: [] Adjusted  2. Patient will demonstrate a 50% or > decrease in (B) knee pain at worst in order to help pt better tolerate walking community distances for normal function. [] Progressing: [] Met: [] Not Met: [] Adjusted  3. Patient will demonstrate an increase in Strength to 4+/5 or > in all planes in order to improve knee stability for better prolonged performance of daily tasks that require prolonged standing. [] Progressing: [] Met: [] Not Met: [] Adjusted    Progression Towards Functional goals:  [x] Patient is progressing as expected towards functional goals listed. [] Progression is slowed due to complexities listed. [] Progression has been slowed due to co-morbidities. [] Plan just implemented, too soon to assess goals progression  [] Other:     ASSESSMENT: Incorporated step ups this date as pt reports that stairs remain one of her more challenging functional activities. Pt tolerated well, but did fatigue quickly. No knee pain noted throughout today's session. Pt has demonstrated progressed including the ability to run and jump during basketball without aggravating knee pain. Pt would continue to benefit from therapy focusing on improving proximal hip/functional LE strength.     Return to Play: (if applicable)   []  Stage 1: Intro to Strength   []  Stage 2: Return to Run and Strength   []  Stage 3: Return to Jump and Strength   []  Stage 4: Dynamic Strength and Agility   []  Stage 5: Sport Specific Training     []  Ready to Return to Play, Meets All Above Stages   [] Not Ready for Return to Sports   Comments:            Treatment/Activity Tolerance:  [] Patient tolerated treatment well [x] Patient limited by fatique  [] Patient limited by pain  [] Patient limited by other medical complications  [] Other:     Overall Progression Towards Functional goals/ Treatment Progress Update:  [x] Patient is progressing as expected towards functional goals listed. [] Progression is slowed due to complexities/Impairments listed. [] Progression has been slowed due to co-morbidities. [] Plan just implemented, too soon to assess goals progression <30days   [] Goals require adjustment due to lack of progress  [] Patient is not progressing as expected and requires additional follow up with physician  [] Other    Prognosis for POC: [x] Good [] Fair  [] Poor    Patient requires continued skilled intervention: [x] Yes  [] No        PLAN: Assess tolerance to exercise progressions. [x] Continue per plan of care [] Alter current plan (see comments)  [] Plan of care initiated [] Hold pending MD visit [] Discharge    Electronically signed by: Alvin Lima PT    Note: If patient does not return for scheduled/recommended follow up visits, this note will serve as a discharge from care along with the most recent update on progress.

## 2021-11-19 ENCOUNTER — HOSPITAL ENCOUNTER (OUTPATIENT)
Dept: PHYSICAL THERAPY | Age: 21
Setting detail: THERAPIES SERIES
Discharge: HOME | End: 2021-11-19
Payer: MEDICARE

## 2021-11-19 PROCEDURE — 97110 THERAPEUTIC EXERCISES: CPT

## 2021-11-19 PROCEDURE — 97112 NEUROMUSCULAR REEDUCATION: CPT

## 2021-11-19 NOTE — PROGRESS NOTES
[facility]    Progress Report: .    Date:  2021    Patient Name:  Kenneth Caicedo    :  2000  MRN: [id number]  Medical/Treatment Diagnosis Information:  Diagnosis: M25.561 M25.562  (B) Knee Pain, Unspecified Chronicity  Treatment Diagnosis: M25.561 M25.562 (B) Knee Pain   LE Strength deficits, Decrease Balance  Insurance/Certification information:  PT Insurance Information: [employer] ([employer])  Physician Information:  Referring Practitioner: Dr. Nicholson Chain of care signed (Y/N): Y    Date of Patient follow up with Physician:      Progress Report: [x]  Yes  []  No     Functional Scale: LEFS 21% LOF   Date:  2021    Date Range for reporting period:  Beginnin2021  Endin2021    Progress report due (10 Rx/or 30 days whichever is less): 77Q    Recertification due (POC duration/ or 90 days whichever is less): 8 weeks    Visit # Insurance Allowable Auth Needed   5 16 visits through 2021 [x]Yes    []No     Pain level: 0/10 currently    SUBJECTIVE:  Pt notes minimal pain in her knees over the past few days. Pt feels she is improving with therapy at this time.     OBJECTIVE: 2021   Observation:    Test measurements:      Strength:  Quads: (R) 5/5 (L): 5/5  Hip Flexion: (R): 5/5 (L): 5/5  Hip Abd: (R): 4/5 (L): 4/5    AROM Knees:  (R): +5 (hyperextension) to 132 deg  (L): +5 (hyperextension) to 135 deg    RESTRICTIONS/PRECAUTIONS: Pt reported HTN, Prader-Willi Syndrome, Anxiety/Depression    Exercises/Interventions:     Therapeutic Ex x 24' Resistance Sets/sec Reps Notes   Sidelying Abduction 2lb 2 10 ea side HEP ^    Supine Bridge DL  SL Ecc 1  1 10  5 ea side HEP cueing for glut activation, neutral spine ^    S/L clamshells Yellow 2 10 ea ^  Held   SLR flex  2 10 Cues to avoid knee hyperextension held    Bike  4'  For ROM/mobility   Leg Press DL 70# 2 10 Range: 90-10 Added     Standing ER/abd into wall Ball 5\" 10 ea way Added 11/12                                        Therapeutic Activities                                                               Manual Intervention       Knee mobs/PROM       Tib/Fem Mobs       Patella Mobs       Ankle mobs                     NMR re-education x 12'       SLS @ 1/2 wall  10\" 3 ea Added 11/5 Held   Retro Stepdowns 4\" 2 10 ea Added 11/5 Cueing for eccentric knee control and balance Held   Lateral Stepping maroon 3 laps 10 steps down/back Added 11/10 cueing for Held   Fwd Steps 6\" 2 10 ea Added 11/12 cueing for   Tiltboard A/P 2 10 ea way Added 11/19                               Access Code: [email or web address]  URL: [email or web address]  Date: 11/10/2021  Prepared by: Lauren Ley    Exercises  Supine Bridge - 1 x daily - 2-3 x weekly - 3 sets - 10 reps  Sidelying Hip Abduction - 1 x daily - 2-3 x weekly - 3 sets - 10 reps  Supine Active Straight Leg Raise - 1 x daily - 3 x weekly - 3 sets - 10 reps      Therapeutic Exercise and NMR EXR  [x] (85842) Provided verbal/tactile cueing for activities related to strengthening, flexibility, endurance, ROM for improvements in LE, proximal hip, and core control with self care, mobility, lifting, ambulation.  [] (58471) Provided verbal/tactile cueing for activities related to improving balance, coordination, kinesthetic sense, posture, motor skill, proprioception  to assist with LE, proximal hip, and core control in self care, mobility, lifting, ambulation and eccentric single leg control.      NMR and Therapeutic Activities:    [x] (88751 or 85388) Provided verbal/tactile cueing for activities related to improving balance, coordination, kinesthetic sense, posture, motor skill, proprioception and motor activation to allow for proper function of core, proximal hip and LE with self care and ADLs  [] (19555) Gait Re-education- Provided training and instruction to the patient for proper LE, core and proximal hip recruitment and positioning and eccentric body weight control with ambulation re-education including up and down stairs     Home Exercise Program:    [x] (38675) Reviewed/Progressed HEP activities related to strengthening, flexibility, endurance, ROM of core, proximal hip and LE for functional self-care, mobility, lifting and ambulation/stair navigation   [] (10306)Reviewed/Progressed HEP activities related to improving balance, coordination, kinesthetic sense, posture, motor skill, proprioception of core, proximal hip and LE for self care, mobility, lifting, and ambulation/stair navigation      Manual Treatments:  PROM / STM / Oscillations-Mobs:  G-I, II, III, IV (PA's, Inf., Post.)  [] (25181) Provided manual therapy to mobilize LE, proximal hip and/or LS spine soft tissue/joints for the purpose of modulating pain, promoting relaxation,  increasing ROM, reducing/eliminating soft tissue swelling/inflammation/restriction, improving soft tissue extensibility and allowing for proper ROM for normal function with self care, mobility, lifting and ambulation. Modalities:  deferred    Charges:  Timed Code Treatment Minutes: 43'   Total Treatment Minutes: 39'       [] EVAL (LOW) 455 1011 (typically 20 minutes face-to-face)  [] EVAL (MOD) 59531 (typically 30 minutes face-to-face)  [] EVAL (HIGH) 89848 (typically 45 minutes face-to-face)  [] RE-EVAL     [x] TZ(82438) x 2  [] IONTO (91222)  [x] NMR (59111) x1     [] VASO (24361)  [] Manual (65822) x     [] Other:  [] TA (61567)x     [] Mech Traction (53748)  [] ES(attended) (72684)     [] ES (un) (66359): If BW Please Indicate Time In/Out and Total Minutes  CPT Code Time in Time out Total Min                                           GOALS:  Patient stated goal:Gain strength, decrease pain, work  [] Progressing: [] Met: [] Not Met: [] Adjusted    Therapist goals for Patient:   Short Term Goals: To be achieved in: 2 weeks  1.  Independent in HEP and progression per patient tolerance, in order to prevent re-injury. [] Progressing: [x] Met: [] Not Met: [] Adjusted  2. Patient will have a decrease in pain to facilitate improvement in movement, function, and ADLs as indicated by Functional Deficits. [] Progressing: [x] Met: [] Not Met: [] Adjusted    Long Term Goals: To be achieved in: 8 weeks  1. Disability index score of 20% or less for the LEFS to demonstrate improve with daily function including performance of household chores   [x] Progressing: [] Met: [] Not Met: [] Adjusted  2. Patient will demonstrate a 50% or > decrease in (B) knee pain at worst in order to help pt better tolerate walking community distances for normal function. [] Progressing: [x] Met: [] Not Met: [] Adjusted  3. Patient will demonstrate an increase in Strength to 4+/5 or > in all planes in order to improve knee stability for better prolonged performance of daily tasks that require prolonged standing. [x] Progressing: [] Met: [] Not Met: [] Adjusted    Progression Towards Functional goals:  [x] Patient is progressing as expected towards functional goals listed. [] Progression is slowed due to complexities listed. [] Progression has been slowed due to co-morbidities. [] Plan just implemented, too soon to assess goals progression  [] Other:     ASSESSMENT: Pt demonstrates improving LE strength and decreasing pain severity and frequency. Pt is doing well with therapy at this time. Pt does not intermittent non-painful clicking in the (R) knee. Pt would continue to benefit from formal therapy in order to further address strength deficits and to help improve endurance for all normal daily activities.     Return to Play: (if applicable)   []  Stage 1: Intro to Strength   []  Stage 2: Return to Run and Strength   []  Stage 3: Return to Jump and Strength   []  Stage 4: Dynamic Strength and Agility   []  Stage 5: Sport Specific Training     []  Ready to Return to Play, Meets All Above Stages   []  Not Ready for Return to Sports   Comments:            Treatment/Activity Tolerance:  [] Patient tolerated treatment well [x] Patient limited by fatique  [] Patient limited by pain  [] Patient limited by other medical complications  [] Other:     Overall Progression Towards Functional goals/ Treatment Progress Update:  [x] Patient is progressing as expected towards functional goals listed. [] Progression is slowed due to complexities/Impairments listed. [] Progression has been slowed due to co-morbidities. [] Plan just implemented, too soon to assess goals progression <30days   [] Goals require adjustment due to lack of progress  [] Patient is not progressing as expected and requires additional follow up with physician  [] Other    Prognosis for POC: [x] Good [] Fair  [] Poor    Patient requires continued skilled intervention: [x] Yes  [] No        PLAN: Assess tolerance to exercise progressions. [x] Continue per plan of care [] Alter current plan (see comments)  [] Plan of care initiated [] Hold pending MD visit [] Discharge    Electronically signed by: Adeola Mariscal PT    Note: If patient does not return for scheduled/recommended follow up visits, this note will serve as a discharge from care along with the most recent update on progress.

## 2021-11-26 ENCOUNTER — HOSPITAL ENCOUNTER (OUTPATIENT)
Dept: PHYSICAL THERAPY | Age: 21
Setting detail: THERAPIES SERIES
Discharge: HOME | End: 2021-11-26
Payer: MEDICARE

## 2021-11-26 NOTE — PROGRESS NOTES
Physical Therapy    [facility]    Physical Therapy  Cancellation/No-show Note  Patient Name:  Case Mancini  :  2000   Date:  2021  Cancelled visits to date:   No-shows to date: 1    For today's appointment patient:  [x]  Cancelled  []  Rescheduled appointment  []  No-show     Reason given by patient:  []  Patient ill  []  Conflicting appointment  []  No transportation    []  Conflict with work  []  No reason given  [x]  Other:  Pt and pt's mother forgot. Comments:      Phone call information:   [x]  Phone call made today to patient at _ time at number provided:      [x]  Patient answered, conversation as follows: Pt's mother states that they just woke up. Pt re-scheduled at a later date. []  Patient did not answer, message left as follows:  []  Phone call not made today  []  Phone call not needed - pt contacted us to cancel and provided reason for cancellation.      Electronically signed by:  Jaspal Mar, PT, PT

## 2021-11-30 ENCOUNTER — OFFICE VISIT (OUTPATIENT)
Dept: FAMILY MEDICINE CLINIC | Age: 21
End: 2021-11-30
Payer: MEDICARE

## 2021-11-30 VITALS
TEMPERATURE: 97.9 F | BODY MASS INDEX: 36.49 KG/M2 | WEIGHT: 181 LBS | SYSTOLIC BLOOD PRESSURE: 132 MMHG | HEIGHT: 59 IN | HEART RATE: 76 BPM | DIASTOLIC BLOOD PRESSURE: 78 MMHG | OXYGEN SATURATION: 98 %

## 2021-11-30 DIAGNOSIS — E03.8 CENTRAL HYPOTHYROIDISM: ICD-10-CM

## 2021-11-30 DIAGNOSIS — Q87.11 PRADER-WILLI SYNDROME: ICD-10-CM

## 2021-11-30 DIAGNOSIS — Z02.5 ROUTINE SPORTS PHYSICAL EXAM: Primary | ICD-10-CM

## 2021-11-30 DIAGNOSIS — F41.1 GENERALIZED ANXIETY DISORDER: ICD-10-CM

## 2021-11-30 PROCEDURE — G0008 ADMIN INFLUENZA VIRUS VAC: HCPCS | Performed by: FAMILY MEDICINE

## 2021-11-30 PROCEDURE — G8484 FLU IMMUNIZE NO ADMIN: HCPCS | Performed by: FAMILY MEDICINE

## 2021-11-30 PROCEDURE — 99395 PREV VISIT EST AGE 18-39: CPT | Performed by: FAMILY MEDICINE

## 2021-11-30 PROCEDURE — 90674 CCIIV4 VAC NO PRSV 0.5 ML IM: CPT | Performed by: FAMILY MEDICINE

## 2021-11-30 RX ORDER — ESCITALOPRAM OXALATE 5 MG/1
TABLET ORAL
COMMUNITY
Start: 2021-11-14 | End: 2021-11-30

## 2021-11-30 RX ORDER — HYDROXYZINE HYDROCHLORIDE 10 MG/1
10 TABLET, FILM COATED ORAL 3 TIMES DAILY PRN
COMMUNITY

## 2021-11-30 RX ORDER — LEVOTHYROXINE SODIUM 137 UG/1
TABLET ORAL
Qty: 30 TABLET | Refills: 0 | COMMUNITY
Start: 2021-11-30

## 2021-11-30 RX ORDER — ESCITALOPRAM OXALATE 10 MG/1
10 TABLET ORAL DAILY
COMMUNITY

## 2021-11-30 RX ORDER — ESCITALOPRAM OXALATE 10 MG/1
TABLET ORAL
COMMUNITY
Start: 2021-09-09 | End: 2021-11-30

## 2021-11-30 RX ORDER — ESCITALOPRAM OXALATE 5 MG/1
5 TABLET ORAL DAILY
COMMUNITY

## 2021-11-30 NOTE — PROGRESS NOTES
Chief Complaint: Annual Exam (forms to fill out, concern for arthritis in hands joint pain)       HPI: She is here for annual wellness visit and clearance for sports physical  She has been participating for basketball team.  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 She denies any symptoms of chest pain shortness of breath. Has mild muscle weakness and scoliosis but has been physically active. She has constipation and has been treated symptomatically. As a kid she had seizures. Currently denies having any symptoms    Current Outpatient Medications   Medication Sig Dispense Refill    escitalopram (LEXAPRO) 5 MG tablet Take 5 mg by mouth daily      escitalopram (LEXAPRO) 10 MG tablet Take 10 mg by mouth daily      hydrOXYzine (ATARAX) 10 MG tablet Take 10 mg by mouth 3 times daily as needed for Itching Unsure of dosage  prn      levothyroxine (SYNTHROID) 137 MCG tablet  30 tablet 0    omeprazole (PRILOSEC) 10 MG delayed release capsule TAKE 1 CAPSULE ONE TIME NIGHTLY 90 capsule 0    polyethylene glycol (GLYCOLAX) 17 GM/SCOOP powder Take 26 g by mouth daily 1 Bottle 1    Syringe/Needle, Disp, (SYRINGE 3CC/22GX1\") 22G X 1\" 3 ML MISC FOR MIXING DILUENT AND GROWTH HORMONE MEDICATION ONLY 100 each 1    magnesium hydroxide (MILK OF MAGNESIA) 400 MG/5ML suspension Take 30 mLs by mouth daily as needed for Constipation      Cholecalciferol (VITAMIN D3) 50 MCG (2000 UT) TABS Take 2,000 Units by mouth      acetaminophen (TYLENOL) 325 MG tablet Take 650 mg by mouth       estradiol (VIVELLE) 0.1 MG/24HR APPLY ONE PATCH TO SKIN TWICE A WEEK . DO NOT CUT THE PATCH      Insulin Syringe-Needle U-100 (B-D INS SYR HALF-UNIT .3CC/31G) 31G X 5/16\" 0.3 ML MISC Use as directed to administer growth hormone medication daily      Somatropin 5.8 MG SOLR INJECT 0.4MG SUBCUTANEOUSLY ONCE DAILY. DISCARD VIAL 21 DAYS AFTER RECONSTITUTION. REFRIGERATE.  fluticasone (FLONASE) 50 MCG/ACT nasal spray 2 sprays by Each Nostril route daily (Patient not taking: Reported on 11/30/2021) 1 Bottle 5    fluticasone (FLONASE) 50 MCG/ACT nasal spray PLACE TWO SPRAYS IN EACH NOSTRIL ONCE DAILY (Patient not taking: Reported on 8/30/2021)       No current facility-administered medications for this visit. Social History     Tobacco Use    Smoking status: Smoker, Current Status Unknown    Smokeless tobacco: Current User    Tobacco comment: vape stick   Substance Use Topics    Alcohol use: Yes            Review of Systems:  Constitutional: Negative  HENT: Negative   Eyes: Negative for photophobia, discharge, redness and visual disturbance. Respiratory: Negative for cough, chest tightness, shortness of breath and wheezing. Cardiovascular: Negative for chest pain, palpitations and leg swelling. Gastrointestinal: Negative   Genitourinary: Negative for difficulty urinating, flank pain, frequency, hematuria and urgency. Musculoskeletal: sometimes back pain. Skin: at times picks on her hand and currently no open wounds  Neurological: Negative for dizziness, tremors, seizures, syncope, facial asymmetry, speech difficulty, light-headedness, numbness and headaches. Psychiatric/Behavioral: self injurious behavior         Objective:     Vitals:    11/30/21 1418   BP: 132/78   Pulse: 76   Temp: 97.9 °F (36.6 °C)   TempSrc: Oral   SpO2: 98%   Weight: 181 lb (82.1 kg)   Height: 4' 11\" (1.499 m)     Body mass index is 36.56 kg/m². Wt Readings from Last 3 Encounters:   11/30/21 181 lb (82.1 kg)   10/04/21 171 lb (77.6 kg)   08/30/21 171 lb 3.2 oz (77.7 kg)     BP Readings from Last 3 Encounters:   11/30/21 132/78   08/30/21 124/76   09/17/20 (!) 136/90       Physical exam:  Constitutional: she is oriented to person, place, and time. she appears well-developed and well-nourished. No distress. HENT:   Head: Normocephalic.    Right Ear: External ear normal. Normal TM Herlinda Crain MD  11/30/2021 3:41 PM

## 2021-12-06 ENCOUNTER — HOSPITAL ENCOUNTER (OUTPATIENT)
Dept: PHYSICAL THERAPY | Age: 21
Setting detail: THERAPIES SERIES
Discharge: HOME | End: 2021-12-06
Payer: MEDICARE

## 2021-12-06 PROCEDURE — 97140 MANUAL THERAPY 1/> REGIONS: CPT

## 2021-12-06 PROCEDURE — 97110 THERAPEUTIC EXERCISES: CPT

## 2021-12-06 NOTE — FLOWSHEET NOTE
[facility]    [facility]    . Date:  2021    Patient Name:  Cora Bennett    :  2000  MRN: [id number]  Medical/Treatment Diagnosis Information:  Diagnosis: M25.561 M25.562  (B) Knee Pain, Unspecified Chronicity  Treatment Diagnosis: M25.561 M25.562 (B) Knee Pain   LE Strength deficits, Decrease Balance  Insurance/Certification information:  PT Insurance Information: [employer] ([employer])  Physician Information:  Referring Practitioner: Dr. Tashia Stephenson of care signed (Y/N): Y    Date of Patient follow up with Physician:      Progress Report: [x]  Yes  []  No     Functional Scale: LEFS 21% LOF   Date:  2021    Date Range for reporting period:  Beginnin2021  Endin2021    Progress report due (10 Rx/or 30 days whichever is less): 91S    Recertification due (POC duration/ or 90 days whichever is less): 2021    Visit # Insurance Allowable Auth Needed   6 16 visits through 2021 [x]Yes    []No     Pain level: 0/10 currently    SUBJECTIVE:  Pt notes she had (R) lateral Knee/lower leg tightness this morning with chair yoga. Pt notes she could feels this down to her achilles area. Pt states she has not had this before. Pt notes it has since resolved. Pt reports that prior to this morning, she has been painfree in B() knees.     OBJECTIVE: 2021   Observation:     Test measurements:      (+) Jessica on (R)    Quad flexibility deficits (B)    > on (R)   Soft tissue restrictions noted (R) TFL/IT Band    AROM Knees:  (R): +5 (hyperextension) to 132 deg  (L): +5 (hyperextension) to 135 deg    RESTRICTIONS/PRECAUTIONS: Pt reported HTN, Prader-Willi Syndrome, Anxiety/Depression    Exercises/Interventions:     Therapeutic Ex x 26' Resistance Sets/sec Reps Notes   Sidelying Abduction 2lb 3 10 (R) only HEP ^    Supine Bridge   SL Ecc   2   5 ea side HEP cueing for glut activation, neutral spine ^ volume 12/6   S/L clamshells Yellow 2 10 ea ^ 11/5 Held   SLR flex  2 10 Cues to avoid knee hyperextension held    Bike  4'  For ROM/mobility   Leg Press DL 80# 2 10 Range: 90-10 Added 11/5  ^12/6   Standing ER/abd into wall Ball 5\" 10 ea way Added 11/12 Held   Standing Gastroc Stretch IB 30\" 3 Added 12/16   Bosu Lunge  3\" 10 ea Added 12/16                          Therapeutic Activities                                                               Manual Intervention x 14'       Knee mobs/PROM       Tib/Fem Mobs       Patella Mobs        Manual Quad Stretch 6'     STM (R) TFL/IT Band 8'            NMR re-education x        SLS @ 1/2 wall  10\" 3 ea Added 11/5 Held   Retro Stepdowns 4\" 2 10 ea Added 11/5 Cueing for eccentric knee control and balance Held   Lateral Stepping maroon 3 laps 10 steps down/back Added 11/10 cueing for Held   Tiltboard A/P 2 10 ea way Added 11/19                               Access Code: [email or web address]  URL: [email or web address]  Date: 11/10/2021  Prepared by: Dayan Davidson    Exercises  Supine Bridge - 1 x daily - 2-3 x weekly - 3 sets - 10 reps  Sidelying Hip Abduction - 1 x daily - 2-3 x weekly - 3 sets - 10 reps  Supine Active Straight Leg Raise - 1 x daily - 3 x weekly - 3 sets - 10 reps      Therapeutic Exercise and NMR EXR  [x] (45825) Provided verbal/tactile cueing for activities related to strengthening, flexibility, endurance, ROM for improvements in LE, proximal hip, and core control with self care, mobility, lifting, ambulation.  [] (40168) Provided verbal/tactile cueing for activities related to improving balance, coordination, kinesthetic sense, posture, motor skill, proprioception  to assist with LE, proximal hip, and core control in self care, mobility, lifting, ambulation and eccentric single leg control.      NMR and Therapeutic Activities:    [x] (97100 or 98296) Provided verbal/tactile cueing for activities related to improving balance, coordination, kinesthetic sense, posture, motor skill, proprioception and motor activation to allow for proper function of core, proximal hip and LE with self care and ADLs  [] (27183) Gait Re-education- Provided training and instruction to the patient for proper LE, core and proximal hip recruitment and positioning and eccentric body weight control with ambulation re-education including up and down stairs     Home Exercise Program:    [x] (60308) Reviewed/Progressed HEP activities related to strengthening, flexibility, endurance, ROM of core, proximal hip and LE for functional self-care, mobility, lifting and ambulation/stair navigation   [] (43659)Reviewed/Progressed HEP activities related to improving balance, coordination, kinesthetic sense, posture, motor skill, proprioception of core, proximal hip and LE for self care, mobility, lifting, and ambulation/stair navigation      Manual Treatments:  PROM / STM / Oscillations-Mobs:  G-I, II, III, IV (PA's, Inf., Post.)  [] (67492) Provided manual therapy to mobilize LE, proximal hip and/or LS spine soft tissue/joints for the purpose of modulating pain, promoting relaxation,  increasing ROM, reducing/eliminating soft tissue swelling/inflammation/restriction, improving soft tissue extensibility and allowing for proper ROM for normal function with self care, mobility, lifting and ambulation. Modalities:  deferred    Charges:  Timed Code Treatment Minutes: 40'   Total Treatment Minutes: 37'       [] EVAL (LOW) 455 1011 (typically 20 minutes face-to-face)  [] EVAL (MOD) 09351 (typically 30 minutes face-to-face)  [] EVAL (HIGH) 37069 (typically 45 minutes face-to-face)  [] RE-EVAL     [x] GH(02084) x 2  [] IONTO (43318)  [] NMR (47814) x   [] VASO (95242)  [x] Manual (78778) x 1    [] Other:  [] TA (24646)x     [] Mech Traction (30128)  [] ES(attended) (28679)     [] ES (un) (07699):      If BWC Please Indicate Time In/Out and Total Minutes  CPT Code Time in Time out Total Min GOALS:  Patient stated goal:Gain strength, decrease pain, work  [] Progressing: [] Met: [] Not Met: [] Adjusted    Therapist goals for Patient:   Short Term Goals: To be achieved in: 2 weeks  1. Independent in HEP and progression per patient tolerance, in order to prevent re-injury. [] Progressing: [x] Met: [] Not Met: [] Adjusted  2. Patient will have a decrease in pain to facilitate improvement in movement, function, and ADLs as indicated by Functional Deficits. [] Progressing: [x] Met: [] Not Met: [] Adjusted    Long Term Goals: To be achieved in: 8 weeks  1. Disability index score of 20% or less for the LEFS to demonstrate improve with daily function including performance of household chores   [x] Progressing: [] Met: [] Not Met: [] Adjusted  2. Patient will demonstrate a 50% or > decrease in (B) knee pain at worst in order to help pt better tolerate walking community distances for normal function. [] Progressing: [x] Met: [] Not Met: [] Adjusted  3. Patient will demonstrate an increase in Strength to 4+/5 or > in all planes in order to improve knee stability for better prolonged performance of daily tasks that require prolonged standing. [x] Progressing: [] Met: [] Not Met: [] Adjusted    Progression Towards Functional goals:  [x] Patient is progressing as expected towards functional goals listed. [] Progression is slowed due to complexities listed. [] Progression has been slowed due to co-morbidities. [] Plan just implemented, too soon to assess goals progression  [] Other:     ASSESSMENT: Increased restrictions along distal third of (R) TFL/IT Band produced familiar \"tightness\" with palpation. Improved mobility following manual therapy. Pt continues to do well overall and remains a participant in all normal daily and recreational activity. It is expected that pt will be appropriate for possible d/c to HEP in the next 2-3 visits.     Return to Play: (if applicable)   []  Stage 1: Intro to Strength   []  Stage 2: Return to Run and Strength   []  Stage 3: Return to Jump and Strength   []  Stage 4: Dynamic Strength and Agility   []  Stage 5: Sport Specific Training     []  Ready to Return to Play, Meets All Above Stages   []  Not Ready for Return to Sports   Comments:            Treatment/Activity Tolerance:  [] Patient tolerated treatment well [x] Patient limited by fatique  [] Patient limited by pain  [] Patient limited by other medical complications  [] Other:     Overall Progression Towards Functional goals/ Treatment Progress Update:  [x] Patient is progressing as expected towards functional goals listed. [] Progression is slowed due to complexities/Impairments listed. [] Progression has been slowed due to co-morbidities. [] Plan just implemented, too soon to assess goals progression <30days   [] Goals require adjustment due to lack of progress  [] Patient is not progressing as expected and requires additional follow up with physician  [] Other    Prognosis for POC: [x] Good [] Fair  [] Poor    Patient requires continued skilled intervention: [x] Yes  [] No        PLAN: Assess tolerance to exercise progressions. [x] Continue per plan of care [] Alter current plan (see comments)  [] Plan of care initiated [] Hold pending MD visit [] Discharge    Electronically signed by: Shad Barba PT    Note: If patient does not return for scheduled/recommended follow up visits, this note will serve as a discharge from care along with the most recent update on progress.

## 2022-01-03 ENCOUNTER — APPOINTMENT (OUTPATIENT)
Dept: PHYSICAL THERAPY | Age: 22
End: 2022-01-03
Payer: MEDICARE

## 2022-01-10 ENCOUNTER — HOSPITAL ENCOUNTER (OUTPATIENT)
Dept: PHYSICAL THERAPY | Age: 22
Setting detail: THERAPIES SERIES
Discharge: HOME | End: 2022-01-10
Payer: MEDICARE

## 2022-01-10 PROCEDURE — 97110 THERAPEUTIC EXERCISES: CPT

## 2022-01-10 PROCEDURE — 97112 NEUROMUSCULAR REEDUCATION: CPT

## 2022-01-10 NOTE — PLAN OF CARE
[facility]     Physical Therapy Discharge Summary    Dear  Dr. Marguerite Saeed,    We had the pleasure of treating the following patient for physical therapy services at [address] [place] [address]. A summary of our findings can be found in the discharge summary below. If you have any questions or concerns regarding these findings, please do not hesitate to contact me at the office phone number checked above. Thank you for the referral.     Physician Signature:________________________________Date:__________________  By signing above (or electronic signature), therapists plan is approved by physician      Functional Outcome: LEFS 4% LOF    Overall Response to Treatment:   [x]Patient is responding well to treatment and improvement is noted with regards  to goals   []Patient should continue to improve in reasonable time if they continue HEP   []Patient has plateaued and is no longer responding to skilled PT intervention    []Patient is getting worse and would benefit from return to referring MD   []Patient unable to adhere to initial POC   [x]Other: Pt is now appropriate for d/c to HEP    Date range of Visits: 10/22/2022-1/10/2022  Total Visits: 7    Recommendation:    [x] Discharge to HEP. Follow up with PT PRN.      .    Date:  1/10/2022    Patient Name:  Lian Medrano    :  2000  MRN: [id number]  Medical/Treatment Diagnosis Information:  Diagnosis: M25.561 M25.562  (B) Knee Pain, Unspecified Chronicity  Treatment Diagnosis: M25.561 M25.562 (B) Knee Pain   LE Strength deficits, Decrease Balance  Insurance/Certification information:  PT Insurance Information: [employer] ([employer])  Physician Information:  Referring Practitioner: Dr. Nini Mccoy of care signed (Y/N): Y    Date of Patient follow up with Physician:      Progress Report: [x]  Yes  []  No     Functional Scale: LEFS 4% LOF   Date:  1/10/2022    Date Range for reporting down/back Added 11/10 cueing for Held   Tiltboard A/P 2 10 ea way Added 11/19                               Access Code: [email or web address]  URL: [email or web address]  Date: 11/10/2021  Prepared by: Eladia Mary    Exercises  Supine Bridge - 1 x daily - 2-3 x weekly - 3 sets - 10 reps  Sidelying Hip Abduction - 1 x daily - 2-3 x weekly - 3 sets - 10 reps  Supine Active Straight Leg Raise - 1 x daily - 3 x weekly - 3 sets - 10 reps      Therapeutic Exercise and NMR EXR  [x] (25677) Provided verbal/tactile cueing for activities related to strengthening, flexibility, endurance, ROM for improvements in LE, proximal hip, and core control with self care, mobility, lifting, ambulation.  [] (45596) Provided verbal/tactile cueing for activities related to improving balance, coordination, kinesthetic sense, posture, motor skill, proprioception  to assist with LE, proximal hip, and core control in self care, mobility, lifting, ambulation and eccentric single leg control.      NMR and Therapeutic Activities:    [x] (58471 or 37610) Provided verbal/tactile cueing for activities related to improving balance, coordination, kinesthetic sense, posture, motor skill, proprioception and motor activation to allow for proper function of core, proximal hip and LE with self care and ADLs  [] (40240) Gait Re-education- Provided training and instruction to the patient for proper LE, core and proximal hip recruitment and positioning and eccentric body weight control with ambulation re-education including up and down stairs     Home Exercise Program:    [x] (80891) Reviewed/Progressed HEP activities related to strengthening, flexibility, endurance, ROM of core, proximal hip and LE for functional self-care, mobility, lifting and ambulation/stair navigation   [] (97919)Reviewed/Progressed HEP activities related to improving balance, coordination, kinesthetic sense, posture, motor skill, proprioception of core, proximal hip and LE for self care, mobility, lifting, and ambulation/stair navigation      Manual Treatments:  PROM / STM / Oscillations-Mobs:  G-I, II, III, IV (PA's, Inf., Post.)  [] (72673) Provided manual therapy to mobilize LE, proximal hip and/or LS spine soft tissue/joints for the purpose of modulating pain, promoting relaxation,  increasing ROM, reducing/eliminating soft tissue swelling/inflammation/restriction, improving soft tissue extensibility and allowing for proper ROM for normal function with self care, mobility, lifting and ambulation. Modalities:  deferred    Charges:  Timed Code Treatment Minutes: 39'   Total Treatment Minutes: 39'       [] EVAL (LOW) 455 1011 (typically 20 minutes face-to-face)  [] EVAL (MOD) 85055 (typically 30 minutes face-to-face)  [] EVAL (HIGH) 10313 (typically 45 minutes face-to-face)  [] RE-EVAL     [x] MO(49777) x 2  [] IONTO (44342)  [x] NMR (95804) x   [] VASO (36744)  [] Manual (53761) x     [] Other:  [] TA (06387)x     [] Mech Traction (05095)  [] ES(attended) (28319)     [] ES (un) (01069): If BWC Please Indicate Time In/Out and Total Minutes  CPT Code Time in Time out Total Min                                           GOALS:  Patient stated goal:Gain strength, decrease pain, work  [] Progressing: [] Met: [] Not Met: [] Adjusted    Therapist goals for Patient:   Short Term Goals: To be achieved in: 2 weeks  1. Independent in HEP and progression per patient tolerance, in order to prevent re-injury. [] Progressing: [x] Met: [] Not Met: [] Adjusted  2. Patient will have a decrease in pain to facilitate improvement in movement, function, and ADLs as indicated by Functional Deficits. [] Progressing: [x] Met: [] Not Met: [] Adjusted    Long Term Goals: To be achieved in: 8 weeks  1. Disability index score of 20% or less for the LEFS to demonstrate improve with daily function including performance of household chores   [] Progressing: [x] Met: [] Not Met: [] Adjusted  2.  Patient will demonstrate a 50% or > decrease in (B) knee pain at worst in order to help pt better tolerate walking community distances for normal function. [] Progressing: [x] Met: [] Not Met: [] Adjusted  3. Patient will demonstrate an increase in Strength to 4+/5 or > in all planes in order to improve knee stability for better prolonged performance of daily tasks that require prolonged standing. [] Progressing: [x] Met: [] Not Met: [] Adjusted    Progression Towards Functional goals:  [x] Patient is progressing as expected towards functional goals listed. [] Progression is slowed due to complexities listed. [] Progression has been slowed due to co-morbidities. [] Plan just implemented, too soon to assess goals progression  [] Other:     ASSESSMENT: At this time the patient has met her stated functional goals. Pt is participating fully in all normal daily and sporting activities including basketball without any knee pain or limitation. Pt is now appropriate for dc to HEP. Pt is in agreement with this plan. Return to Play: (if applicable)   []  Stage 1: Intro to Strength   []  Stage 2: Return to Run and Strength   []  Stage 3: Return to Jump and Strength   []  Stage 4: Dynamic Strength and Agility   []  Stage 5: Sport Specific Training     []  Ready to Return to Play, Meets All Above Stages   []  Not Ready for Return to Sports   Comments:            Treatment/Activity Tolerance:  [] Patient tolerated treatment well [x] Patient limited by fatique  [] Patient limited by pain  [] Patient limited by other medical complications  [] Other:     Overall Progression Towards Functional goals/ Treatment Progress Update:  [x] Patient is progressing as expected towards functional goals listed. [] Progression is slowed due to complexities/Impairments listed. [] Progression has been slowed due to co-morbidities.   [] Plan just implemented, too soon to assess goals progression <30days   [] Goals require adjustment due to lack of progress  [] Patient is not progressing as expected and requires additional follow up with physician  [] Other    Prognosis for POC: [x] Good [] Fair  [] Poor    Patient requires continued skilled intervention: [] Yes  [x] No        PLAN:   [] Continue per plan of care [] Alter current plan (see comments)  [] Plan of care initiated [] Hold pending MD visit [x] Discharge    Electronically signed by: Conchita Niño PT    Note: If patient does not return for scheduled/recommended follow up visits, this note will serve as a discharge from care along with the most recent update on progress.

## 2022-01-19 DIAGNOSIS — K21.9 GASTROESOPHAGEAL REFLUX DISEASE, UNSPECIFIED WHETHER ESOPHAGITIS PRESENT: Primary | ICD-10-CM

## 2022-01-20 RX ORDER — OMEPRAZOLE 10 MG/1
CAPSULE, DELAYED RELEASE ORAL
Qty: 90 CAPSULE | Refills: 0 | Status: SHIPPED | OUTPATIENT
Start: 2022-01-20 | End: 2022-07-01

## 2022-01-20 NOTE — TELEPHONE ENCOUNTER
Medication:   Requested Prescriptions     Pending Prescriptions Disp Refills    omeprazole (PRILOSEC) 10 MG delayed release capsule [Pharmacy Med Name: OMEPRAZOLE 10 MG Capsule Delayed Release] 90 capsule 0     Sig: TAKE 1 CAPSULE ONE TIME EVERY NIGHT     Last Filled: 9/16/21 #90 refills 0    Last appt: 11/30/2021   Next appt: Visit date not found    Last OARRS: No flowsheet data found.

## 2022-04-11 ENCOUNTER — TELEPHONE (OUTPATIENT)
Dept: FAMILY MEDICINE CLINIC | Age: 22
End: 2022-04-11

## 2022-04-11 ENCOUNTER — OFFICE VISIT (OUTPATIENT)
Dept: FAMILY MEDICINE CLINIC | Age: 22
End: 2022-04-11
Payer: MEDICARE

## 2022-04-11 VITALS
WEIGHT: 179 LBS | TEMPERATURE: 98.1 F | BODY MASS INDEX: 36.08 KG/M2 | HEART RATE: 80 BPM | DIASTOLIC BLOOD PRESSURE: 78 MMHG | HEIGHT: 59 IN | OXYGEN SATURATION: 99 % | SYSTOLIC BLOOD PRESSURE: 130 MMHG

## 2022-04-11 DIAGNOSIS — M79.641 RIGHT HAND PAIN: ICD-10-CM

## 2022-04-11 DIAGNOSIS — R30.0 DYSURIA: Primary | ICD-10-CM

## 2022-04-11 LAB
BILIRUBIN, POC: NORMAL
BLOOD URINE, POC: NORMAL
CLARITY, POC: CLEAR
COLOR, POC: YELLOW
GLUCOSE URINE, POC: NORMAL
KETONES, POC: NORMAL
LEUKOCYTE EST, POC: NORMAL
NITRITE, POC: NORMAL
PH, POC: 7.5
PROTEIN, POC: NORMAL
SPECIFIC GRAVITY, POC: 1.02
UROBILINOGEN, POC: 0.2

## 2022-04-11 PROCEDURE — 81002 URINALYSIS NONAUTO W/O SCOPE: CPT | Performed by: FAMILY MEDICINE

## 2022-04-11 PROCEDURE — G8417 CALC BMI ABV UP PARAM F/U: HCPCS | Performed by: FAMILY MEDICINE

## 2022-04-11 PROCEDURE — 99213 OFFICE O/P EST LOW 20 MIN: CPT | Performed by: FAMILY MEDICINE

## 2022-04-11 PROCEDURE — 4004F PT TOBACCO SCREEN RCVD TLK: CPT | Performed by: FAMILY MEDICINE

## 2022-04-11 PROCEDURE — G8427 DOCREV CUR MEDS BY ELIG CLIN: HCPCS | Performed by: FAMILY MEDICINE

## 2022-04-11 RX ORDER — ESCITALOPRAM OXALATE 20 MG/1
20 TABLET ORAL DAILY
COMMUNITY

## 2022-04-11 NOTE — PROGRESS NOTES
Chief Complaint: Finger Injury (R pinky finger joint) and Urinary Tract Infection       HPI:  Jarrod Hardin is a 24 y.o. female here with c/o increased frequency of urination and dysuria ongoing since 2 days. She also complains of pain in her right little finger  Unsure if she has got any injury. But she is unable to flex or extend    ROS:  Constitutional: Negative  Respiratory: Negative for cough, chest tightness, shortness of breath and wheezing. Cardiovascular: Negative for chest pain, palpitations and leg swelling. Gastrointestinal: Negative for abdominal pain, blood in stool, constipation, diarrhea, nausea and vomiting. Genitourinary: As mentioned above   Musculoskeletal: As mentioned above  Skin: Negative    Patient's problem list, medications, allergies, past medical, surgical, social and family histories were reviewed and updated as appropriate. Current Outpatient Medications   Medication Sig Dispense Refill    escitalopram (LEXAPRO) 20 MG tablet Take 20 mg by mouth daily      omeprazole (PRILOSEC) 10 MG delayed release capsule TAKE 1 CAPSULE ONE TIME EVERY NIGHT 90 capsule 0    hydrOXYzine (ATARAX) 10 MG tablet Take 10 mg by mouth 3 times daily as needed for Anxiety Unsure of dosage  prn      levothyroxine (SYNTHROID) 137 MCG tablet  30 tablet 0    polyethylene glycol (GLYCOLAX) 17 GM/SCOOP powder Take 26 g by mouth daily 1 Bottle 1    Syringe/Needle, Disp, (SYRINGE 3CC/22GX1\") 22G X 1\" 3 ML MISC FOR MIXING DILUENT AND GROWTH HORMONE MEDICATION ONLY 100 each 1    magnesium hydroxide (MILK OF MAGNESIA) 400 MG/5ML suspension Take 30 mLs by mouth daily as needed for Constipation      Cholecalciferol (VITAMIN D3) 50 MCG (2000 UT) TABS Take 2,000 Units by mouth      acetaminophen (TYLENOL) 325 MG tablet Take 650 mg by mouth       estradiol (VIVELLE) 0.1 MG/24HR APPLY ONE PATCH TO SKIN TWICE A WEEK .  DO NOT CUT THE PATCH      Insulin Syringe-Needle U-100 (B-D INS SYR HALF-UNIT .3CC/31G) 31G X 5/16\" 0.3 ML MISC Use as directed to administer growth hormone medication daily      Somatropin 5.8 MG SOLR INJECT 0.4MG SUBCUTANEOUSLY ONCE DAILY. DISCARD VIAL 21 DAYS AFTER RECONSTITUTION. REFRIGERATE.  escitalopram (LEXAPRO) 5 MG tablet Take 5 mg by mouth daily (Patient not taking: Reported on 4/11/2022)      escitalopram (LEXAPRO) 10 MG tablet Take 10 mg by mouth daily (Patient not taking: Reported on 4/11/2022)      fluticasone (FLONASE) 50 MCG/ACT nasal spray 2 sprays by Each Nostril route daily (Patient not taking: Reported on 11/30/2021) 1 Bottle 5    fluticasone (FLONASE) 50 MCG/ACT nasal spray PLACE TWO SPRAYS IN EACH NOSTRIL ONCE DAILY (Patient not taking: Reported on 8/30/2021)       No current facility-administered medications for this visit. Social History     Tobacco Use    Smoking status: Smoker, Current Status Unknown    Smokeless tobacco: Current User    Tobacco comment: vape stick   Substance Use Topics    Alcohol use: Yes        Objective:     Vitals:    04/11/22 1616   BP: 130/78   Site: Right Upper Arm   Position: Sitting   Cuff Size: Small Adult   Pulse: 80   Temp: 98.1 °F (36.7 °C)   TempSrc: Oral   SpO2: 99%   Weight: 179 lb (81.2 kg)   Height: 4' 11\" (1.499 m)     Body mass index is 36.15 kg/m². Wt Readings from Last 3 Encounters:   04/11/22 179 lb (81.2 kg)   11/30/21 181 lb (82.1 kg)   10/04/21 171 lb (77.6 kg)     BP Readings from Last 3 Encounters:   04/11/22 130/78   11/30/21 132/78   08/30/21 124/76       Physical exam:  Constitutional: she is oriented to person, place, and time. she appears well-developed and well-nourished. No distress. Neck: Normal range of motion. No JVD present. No tracheal deviation present. No thyromegaly present. Cardiovascular: Normal rate, regular rhythm, normal heart sounds and intact distal pulses. No murmur heard. Pulmonary/Chest: Effort normal and breath sounds normal. No stridor. No respiratory distress.  she has no wheezes. she has no rales. sheexhibits no tenderness. Abdominal: Soft. Bowel sounds are normal. she exhibits no distension and no mass. There is no tenderness. There is no rebound and no guarding. Musculoskeletal:  Swelling over the metacarpophalangeal joint of the little finger on the right hand  Assessment/Plan:   1. Dysuria  UA negative  We will send it for culture  - POCT Urinalysis no Micro    2. Right hand pain  - XR HAND RIGHT (MIN 3 VIEWS);  Future            Jaimee Pathak MD  4/11/2022 4:46 PM

## 2022-04-12 ENCOUNTER — TELEPHONE (OUTPATIENT)
Dept: FAMILY MEDICINE CLINIC | Age: 22
End: 2022-04-12

## 2022-04-12 ENCOUNTER — HOSPITAL ENCOUNTER (OUTPATIENT)
Dept: GENERAL RADIOLOGY | Age: 22
Discharge: HOME | End: 2022-04-12
Payer: MEDICARE

## 2022-04-12 DIAGNOSIS — M79.641 RIGHT HAND PAIN: ICD-10-CM

## 2022-04-12 PROCEDURE — 73130 X-RAY EXAM OF HAND: CPT

## 2022-04-13 LAB — URINE CULTURE, ROUTINE: NORMAL

## 2022-07-01 DIAGNOSIS — K21.9 GASTROESOPHAGEAL REFLUX DISEASE, UNSPECIFIED WHETHER ESOPHAGITIS PRESENT: ICD-10-CM

## 2022-07-01 RX ORDER — OMEPRAZOLE 10 MG/1
CAPSULE, DELAYED RELEASE ORAL
Qty: 90 CAPSULE | Refills: 0 | Status: SHIPPED | OUTPATIENT
Start: 2022-07-01

## 2022-07-01 NOTE — TELEPHONE ENCOUNTER
Last OV 4/11/2022   Next OV Visit date not found    Requested Prescriptions     Pending Prescriptions Disp Refills    omeprazole (PRILOSEC) 10 MG delayed release capsule [Pharmacy Med Name: OMEPRAZOLE 10 MG Capsule Delayed Release] 90 capsule 0     Sig: TAKE 1 CAPSULE ONE TIME EVERY NIGHT    pended

## 2022-09-29 ENCOUNTER — TELEMEDICINE (OUTPATIENT)
Dept: FAMILY MEDICINE CLINIC | Age: 22
End: 2022-09-29
Payer: MEDICARE

## 2022-09-29 DIAGNOSIS — E66.01 SEVERE OBESITY (BMI 35.0-39.9) WITH COMORBIDITY (HCC): ICD-10-CM

## 2022-09-29 DIAGNOSIS — E23.0 FEMALE HYPOGONADOTROPIC HYPOGONADISM (HCC): ICD-10-CM

## 2022-09-29 DIAGNOSIS — J02.9 ACUTE VIRAL PHARYNGITIS: Primary | ICD-10-CM

## 2022-09-29 PROCEDURE — 99213 OFFICE O/P EST LOW 20 MIN: CPT | Performed by: FAMILY MEDICINE

## 2022-09-29 PROCEDURE — G8427 DOCREV CUR MEDS BY ELIG CLIN: HCPCS | Performed by: FAMILY MEDICINE

## 2022-09-29 RX ORDER — AMOXICILLIN 400 MG/5ML
500 POWDER, FOR SUSPENSION ORAL 2 TIMES DAILY
Qty: 126 ML | Refills: 0 | Status: SHIPPED | OUTPATIENT
Start: 2022-09-29 | End: 2022-10-09

## 2022-10-05 ENCOUNTER — TELEPHONE (OUTPATIENT)
Dept: FAMILY MEDICINE CLINIC | Age: 22
End: 2022-10-05

## 2022-10-05 NOTE — TELEPHONE ENCOUNTER
Patient fell and may have broken her knee. Their patient is requesting an XR be order so that they can get that evaluated a soon as possible. Informed patient of [facility] in case we are unable to order it in the time span they are needing it by. Please advise.

## 2023-01-17 ENCOUNTER — TELEPHONE (OUTPATIENT)
Dept: FAMILY MEDICINE CLINIC | Age: 23
End: 2023-01-17

## 2023-01-17 NOTE — TELEPHONE ENCOUNTER
Patient has been having a hard time getting a new social security card after losing their original card. They have been to our office asking for a a signed demographic sheet on a [facility] letterhead. They have now told her that they need it to be: - Demographic sheet  - Signed by provider   - On [facility] letterhead  - Noted that \"This information is true and accurate to the best of my knowledge\". - Preferred but not required, notarized    Please return patient's parent call.

## 2023-01-17 NOTE — LETTER
[facility]  [facility] [address] [address] [facility]  Phone: [phone]  Fax: [phone]    Kala Valdovinos MD        2023    Stevie Edwards   SS:   [phone] [place] [address]    Patient Information    Patient Name MRN Legal Sex    Stevie Edwards [id number] F 2000     PATIENT SUMMARY - 23     Stevie Edwards  YOB: 2000        Allergies   Allergen Reactions    Azithromycin Diarrhea    Mosquito (Culex Pipiens) Allergy Skin Test         MOSQUITO BITES - Welts      Encounter medication list currently unavailable here.  This SmartLink will only retrieve medications in an encounter-specific context.          Immunization History   Administered Date(s) Administered    COVID-19, MODERNA BLUE border, Primary or Immunocompromised, (age 12y+), IM, 100 mcg/0.5mL 2021, 2021    HPV 9-valent Araseli Sheeba) 2011, 2012, 2013    HPV Quadrivalent (Gardasil) 2011, 2012, 2013    Influenza A (I9C8-86) Vaccine PF IM 2009    Influenza Vaccine, unspecified formulation 2009, 2011, 2014, 2014, 10/03/2015, 2016, 10/04/2017    Influenza, FLUARIX, FLULAVAL, FLUZONE (age 10 mo+) AND AFLURIA, (age 1 y+), PF, 0.5mL 2019    Influenza, FLUCELVAX, (age 10 mo+), MDCK, PF, 0.5mL 2018, 2021    MMR 2011, 2011    Meningococcal MCV4P (Menactra) 2011, 2011, 10/03/2015, 10/03/2015    Tdap (Boostrix, Adacel) 2011, 2011        Patient Active Problem List   Diagnosis Code    Prader-Willi syndrome Q87.11    Acne vulgaris L70.0    Anisometropia H52.31    Astigmatism H52.209    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    Encephalopathy G93.40    External hemorrhoids with complication I82.0    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 No past medical history on file. No past surgical history on file. Family History   Problem Relation Age of Onset    Other Mother 39         ankylosingspondylitis   Social History         Tobacco Use    Smoking status: Smoker, Current Status Unknown    Smokeless tobacco: Current    Tobacco comments:       vape stick   Vaping Use    Vaping Use: Every day   Substance Use Topics    Alcohol use: Yes    Drug use: Never      Patient Care Team:  Cameron Sewell MD as PCP - General (Family Medicine)  Cameron Sewell MD as PCP - [facility] Empaneled Provider        This information is true and accurate to the best of my knowledge. If you have any questions or concerns, please don't hesitate to call.     Sincerely,        Cameron Sewell MD

## 2023-03-16 ENCOUNTER — OFFICE VISIT (OUTPATIENT)
Dept: FAMILY MEDICINE CLINIC | Age: 23
End: 2023-03-16

## 2023-03-16 VITALS
HEIGHT: 59 IN | OXYGEN SATURATION: 98 % | SYSTOLIC BLOOD PRESSURE: 138 MMHG | RESPIRATION RATE: 16 BRPM | BODY MASS INDEX: 40.16 KG/M2 | WEIGHT: 199.2 LBS | TEMPERATURE: 97.8 F | DIASTOLIC BLOOD PRESSURE: 89 MMHG | HEART RATE: 73 BPM

## 2023-03-16 DIAGNOSIS — E66.01 SEVERE OBESITY (BMI 35.0-39.9) WITH COMORBIDITY (HCC): ICD-10-CM

## 2023-03-16 DIAGNOSIS — R03.0 ELEVATED BP WITHOUT DIAGNOSIS OF HYPERTENSION: ICD-10-CM

## 2023-03-16 DIAGNOSIS — E23.0 FEMALE HYPOGONADOTROPIC HYPOGONADISM (HCC): ICD-10-CM

## 2023-03-16 DIAGNOSIS — R35.0 FREQUENT URINATION: Primary | ICD-10-CM

## 2023-03-16 DIAGNOSIS — Q87.11 PRADER-WILLI SYNDROME: ICD-10-CM

## 2023-03-16 DIAGNOSIS — Z00.00 ANNUAL PHYSICAL EXAM: ICD-10-CM

## 2023-03-16 LAB
BILIRUBIN, POC: NEGATIVE
BLOOD URINE, POC: NEGATIVE
CLARITY, POC: CLEAR
COLOR, POC: NORMAL
GLUCOSE URINE, POC: NEGATIVE
KETONES, POC: NEGATIVE
LEUKOCYTE EST, POC: NEGATIVE
NITRITE, POC: NEGATIVE
PH, POC: 7.5
PROTEIN, POC: NEGATIVE
SPECIFIC GRAVITY, POC: 1.02
UROBILINOGEN, POC: 0.2

## 2023-03-16 RX ORDER — SOMATROPIN 5 MG/1.5ML
INJECTION, SOLUTION SUBCUTANEOUS
COMMUNITY
Start: 2023-01-30

## 2023-03-16 RX ORDER — HYDROXYZINE HYDROCHLORIDE 25 MG/1
25 TABLET, FILM COATED ORAL 3 TIMES DAILY PRN
COMMUNITY

## 2023-03-16 RX ORDER — OXYTOCIN 10 [USP'U]/ML
INJECTION, SOLUTION INTRAMUSCULAR; INTRAVENOUS ONCE
COMMUNITY

## 2023-03-16 SDOH — ECONOMIC STABILITY: FOOD INSECURITY: WITHIN THE PAST 12 MONTHS, YOU WORRIED THAT YOUR FOOD WOULD RUN OUT BEFORE YOU GOT MONEY TO BUY MORE.: NEVER TRUE

## 2023-03-16 SDOH — ECONOMIC STABILITY: HOUSING INSECURITY
IN THE LAST 12 MONTHS, WAS THERE A TIME WHEN YOU DID NOT HAVE A STEADY PLACE TO SLEEP OR SLEPT IN A SHELTER (INCLUDING NOW)?: NO

## 2023-03-16 SDOH — ECONOMIC STABILITY: FOOD INSECURITY: WITHIN THE PAST 12 MONTHS, THE FOOD YOU BOUGHT JUST DIDN'T LAST AND YOU DIDN'T HAVE MONEY TO GET MORE.: NEVER TRUE

## 2023-03-16 SDOH — ECONOMIC STABILITY: INCOME INSECURITY: HOW HARD IS IT FOR YOU TO PAY FOR THE VERY BASICS LIKE FOOD, HOUSING, MEDICAL CARE, AND HEATING?: NOT HARD AT ALL

## 2023-03-16 ASSESSMENT — PATIENT HEALTH QUESTIONNAIRE - PHQ9
SUM OF ALL RESPONSES TO PHQ9 QUESTIONS 1 & 2: 0
SUM OF ALL RESPONSES TO PHQ QUESTIONS 1-9: 0
SUM OF ALL RESPONSES TO PHQ QUESTIONS 1-9: 0
2. FEELING DOWN, DEPRESSED OR HOPELESS: 0
SUM OF ALL RESPONSES TO PHQ QUESTIONS 1-9: 0
SUM OF ALL RESPONSES TO PHQ QUESTIONS 1-9: 0
1. LITTLE INTEREST OR PLEASURE IN DOING THINGS: 0

## 2023-03-16 NOTE — PROGRESS NOTES
Chief Complaint: Hypertension (elevated blood pressure readings for the past few years and left untreated, roused concern to [facility] & patient as its just progressing ) and Urinary Frequency (reports she is urinating more frequently for the past week and a half to two weeks)       HPI:  Jami Mansfield is a 20 y.o. female here with h/o padderwilli syndrome and female hypogonadotropic syndrome, with hypothyroidism, anxiety and depression , self injurious behavior come in with concerns of hypertension and increased frequency of urination. She has been seeing endocrinologist and noticed couple of episodes of elevated bp. She has gained 20 lbs since her last visit and she has increased urination so mother wanting her urine sample to be checked. She denies any having any pain in her abdomen    Also wanted to get the paper work for custody at court as mother is the decision maker for her. ROS:  Constitutional: Negative for appetite change, fatigue, fever and unexpected weight change. HENT: Negative   Respiratory: Negative for cough, chest tightness, shortness of breath and wheezing. Cardiovascular: Negative for chest pain, palpitations and leg swelling. Gastrointestinal: Negative for abdominal pain, blood in stool, constipation, diarrhea, nausea and vomiting. Genitourinary: as mentioned above  Skin  Has self picking behavior and has a open wound due to picking over her nose bridge  Patient's problem list, medications, allergies, past medical, surgical, social and family histories were reviewed and updated as appropriate.      Current Outpatient Medications   Medication Sig Dispense Refill    OMNITROPE 5 MG/1.5ML SOCT       hydrOXYzine HCl (ATARAX) 25 MG tablet Take 25 mg by mouth 3 times daily as needed for Itching      oxytocin (PITOCIN) 10 UNIT/ML injection Inject into the muscle once      omeprazole (PRILOSEC) 10 MG delayed release capsule TAKE 1 CAPSULE ONE TIME EVERY NIGHT 90 capsule 0    escitalopram (LEXAPRO) 20 MG tablet Take 20 mg by mouth daily      levothyroxine (SYNTHROID) 137 MCG tablet  30 tablet 0    polyethylene glycol (GLYCOLAX) 17 GM/SCOOP powder Take 26 g by mouth daily 1 Bottle 1    magnesium hydroxide (MILK OF MAGNESIA) 400 MG/5ML suspension Take 30 mLs by mouth daily as needed for Constipation      acetaminophen (TYLENOL) 325 MG tablet Take 650 mg by mouth       estradiol (VIVELLE) 0.1 MG/24HR APPLY ONE PATCH TO SKIN TWICE A WEEK . DO NOT CUT THE PATCH      fluticasone (FLONASE) 50 MCG/ACT nasal spray PLACE TWO SPRAYS IN EACH NOSTRIL ONCE DAILY      Insulin Syringe-Needle U-100 31G X 5/16\" 0.3 ML MISC Use as directed to administer growth hormone medication daily       No current facility-administered medications for this visit. Social History     Tobacco Use    Smoking status: Smoker, Current Status Unknown    Smokeless tobacco: Current    Tobacco comments:     vape stick   Substance Use Topics    Alcohol use: Yes        Objective:     Vitals:    03/16/23 1409   BP: 138/89   Pulse: 73   Resp: 16   Temp: 97.8 °F (36.6 °C)   TempSrc: Temporal   SpO2: 98%   Weight: 199 lb 3.2 oz (90.4 kg)   Height: 4' 11.49\" (1.511 m)     Body mass index is 39.57 kg/m². Wt Readings from Last 3 Encounters:   03/16/23 199 lb 3.2 oz (90.4 kg)   04/11/22 179 lb (81.2 kg)   11/30/21 181 lb (82.1 kg)     BP Readings from Last 3 Encounters:   03/16/23 138/89   04/11/22 130/78   11/30/21 132/78       Physical exam:  Constitutional: she is oriented to person, place, and time. she appears well-developed and well-nourished. No distress. Neck: Normal range of motion. No JVD present. No tracheal deviation present. No thyromegaly present. Cardiovascular: Normal rate, regular rhythm, normal heart sounds and intact distal pulses. No murmur heard. Pulmonary/Chest: Effort normal and breath sounds normal. No stridor. No respiratory distress. she has no wheezes. she has no rales. sheexhibits no tenderness.    Abdominal: Soft. Bowel sounds are normal. she exhibits no distension and no mass. There is no tenderness. There is no rebound and no guarding. Neurological:she is alert and oriented to person, place, and time. she has gross neurological exam normal with normal strength and normal gait  Skin: Skin is warm and dry. No rash noted. she is not diaphoretic. No erythema. No pallor. Psychiatric: she has a normal mood and affect. her   behavior is normal.      Assessment/Plan:   1. Frequent urination  UA negative and waiting for culture  Could be due diabetes insipidus and following up with endo and awaiting blood work  - POCT Urinalysis no Micro    2. Prader-Willi syndrome    3. Severe obesity (BMI 35.0-39. 9) with comorbidity ([address]  Advised on exercise    4. 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 Annual physical exam  Filled out paper work    6.  Elevated BP without diagnosis of hypertension  We will monitor  Awaiting blood work done at [facility], MD  3/17/2023 12:31 PM

## 2023-04-19 ENCOUNTER — OFFICE VISIT (OUTPATIENT)
Dept: FAMILY MEDICINE CLINIC | Age: 23
End: 2023-04-19

## 2023-04-19 VITALS
DIASTOLIC BLOOD PRESSURE: 89 MMHG | BODY MASS INDEX: 38.14 KG/M2 | OXYGEN SATURATION: 97 % | HEART RATE: 88 BPM | TEMPERATURE: 97.2 F | HEIGHT: 59 IN | SYSTOLIC BLOOD PRESSURE: 135 MMHG | WEIGHT: 189.2 LBS

## 2023-04-19 DIAGNOSIS — J02.8 ACUTE PHARYNGITIS DUE TO OTHER SPECIFIED ORGANISMS: Primary | ICD-10-CM

## 2023-04-19 DIAGNOSIS — B00.1 COLD SORE: ICD-10-CM

## 2023-04-19 LAB — S PYO AG THROAT QL: NORMAL

## 2023-04-19 RX ORDER — LEVOTHYROXINE SODIUM 0.12 MG/1
1 TABLET ORAL DAILY
COMMUNITY
Start: 2023-03-24

## 2023-04-19 RX ORDER — AMOXICILLIN 400 MG/5ML
500 POWDER, FOR SUSPENSION ORAL 2 TIMES DAILY
Qty: 126 ML | Refills: 0 | Status: SHIPPED | OUTPATIENT
Start: 2023-04-19 | End: 2023-04-29

## 2023-04-19 RX ORDER — AMOXICILLIN 500 MG/1
500 CAPSULE ORAL 2 TIMES DAILY
Qty: 20 CAPSULE | Refills: 0 | Status: SHIPPED | OUTPATIENT
Start: 2023-04-19 | End: 2023-04-19

## 2023-04-19 RX ORDER — ACYCLOVIR 50 MG/G
OINTMENT TOPICAL
Qty: 30 G | Refills: 1 | Status: SHIPPED | OUTPATIENT
Start: 2023-04-19 | End: 2023-04-26

## 2023-04-19 RX ORDER — METFORMIN HYDROCHLORIDE 500 MG/1
1000 TABLET, EXTENDED RELEASE ORAL DAILY
COMMUNITY
Start: 2023-03-24

## 2023-04-19 NOTE — PROGRESS NOTES
error
regular rhythm, normal heart sounds and intact distal pulses. No murmur heard. Pulmonary/Chest: Effort normal and breath sounds normal. No stridor. No respiratory distress. she has no wheezes. she has no rales. sheexhibits no tenderness. Abdominal: Soft. Bowel sounds are normal. she exhibits no distension and no mass. There is no tenderness. There is no rebound and no guarding. Assessment/Plan:   1. Acute pharyngitis due to other specified organisms  - POCT rapid strep A  - Culture, Throat  - amoxicillin (AMOXIL) 400 MG/5ML suspension; Take 6.3 mLs by mouth 2 times daily for 10 days  Dispense: 126 mL; Refill: 0    2. Cold sore  - acyclovir (ZOVIRAX) 5 % ointment; Apply topically 5 times daily.   Dispense: 30 g; Refill: MD Radha  4/19/2023 2:40 PM

## 2023-04-20 ENCOUNTER — TELEPHONE (OUTPATIENT)
Dept: FAMILY MEDICINE CLINIC | Age: 23
End: 2023-04-20

## 2023-04-22 LAB — BACTERIA THROAT AEROBE CULT: NORMAL

## 2023-07-18 ENCOUNTER — OFFICE VISIT (OUTPATIENT)
Dept: FAMILY MEDICINE CLINIC | Age: 23
End: 2023-07-18

## 2023-07-18 ENCOUNTER — TELEPHONE (OUTPATIENT)
Dept: FAMILY MEDICINE CLINIC | Age: 23
End: 2023-07-18

## 2023-07-18 VITALS
BODY MASS INDEX: 42.62 KG/M2 | HEART RATE: 80 BPM | SYSTOLIC BLOOD PRESSURE: 137 MMHG | HEIGHT: 59 IN | TEMPERATURE: 97.2 F | DIASTOLIC BLOOD PRESSURE: 89 MMHG | OXYGEN SATURATION: 98 % | WEIGHT: 211.4 LBS

## 2023-07-18 DIAGNOSIS — I15.2 HYPERTENSION DUE TO ENDOCRINE DISORDER: Primary | ICD-10-CM

## 2023-07-18 DIAGNOSIS — E03.8 CENTRAL HYPOTHYROIDISM: ICD-10-CM

## 2023-07-18 DIAGNOSIS — F41.1 GENERALIZED ANXIETY DISORDER: ICD-10-CM

## 2023-07-18 RX ORDER — PROPRANOLOL HYDROCHLORIDE 10 MG/1
10 TABLET ORAL 2 TIMES DAILY
Qty: 60 TABLET | Refills: 0 | Status: SHIPPED | OUTPATIENT
Start: 2023-07-18 | End: 2023-08-17

## 2023-07-18 RX ORDER — BLOOD PRESSURE TEST KIT
1 KIT MISCELLANEOUS DAILY
Qty: 1 KIT | Refills: 0 | Status: SHIPPED | OUTPATIENT
Start: 2023-07-18

## 2023-07-18 NOTE — PROGRESS NOTES
Chief Complaint: Hypertension, Anxiety, and Sleep Apnea       HPI:  Ivanna Mueller is a 20 y.o. 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 Recently her power of  was changed. Currently she is living in her own apartment with 24/7 caregiver. She has history of prediabetes and currently increased metformin extended release 500 mg 3 times a day. She has history of hypothyroidism currently taking Synthroid 125 mcg daily. And has been taking Lexapro 20 mg daily for anxiety. She does take Vistaril to help her anxiety. She is also getting Omnitrope  injections weekly and unsure of the dose and managed by endocrinologist and [facility]. She has Nexplanon. Her blood pressure is fluctuating somewhere between 814 systolic to 721. Denies any chest pain. Her recent blood work has been stable. ROS:  Constitutional: Negative   Respiratory: Negative   Cardiovascular: Negative  Gastrointestinal: Negative  Genitourinary: Negative  Musculoskeletal: Negative     Patient's problem list, medications, allergies, past medical, surgical, social and family histories were reviewed and updated as appropriate.      Current Outpatient Medications   Medication Sig Dispense Refill    Diapers & Supplies (HUGGIES PULL-UPS) MISC 1 each by Does not apply route 2 times daily as needed (diarrhea) 56 each 5    Blood Pressure KIT 1 Device by Does not apply route daily 1 kit 0    propranolol (INDERAL) 10 MG tablet Take 1 tablet by mouth 2 times daily 60 tablet 0    metFORMIN (GLUCOPHAGE-XR) 500 MG extended release tablet Take 2 tablets by mouth Take 2 in am,  2 in afternoon, and 2 in pm      levothyroxine (SYNTHROID) 125 MCG tablet Take 1 tablet by mouth daily      OMNITROPE 5 MG/1.5ML SOCT       oxytocin (PITOCIN) 10 UNIT/ML injection Inject into the muscle once      omeprazole (PRILOSEC) 10 MG

## 2023-07-18 NOTE — TELEPHONE ENCOUNTER
Elizabeth Painter patient's guardian called and gave permission for the patient to be seen today and evaluated 07/18/2023  Call Luciano John if have any questions or concerns

## 2023-07-31 ENCOUNTER — OFFICE VISIT (OUTPATIENT)
Dept: FAMILY MEDICINE CLINIC | Age: 23
End: 2023-07-31

## 2023-07-31 VITALS
SYSTOLIC BLOOD PRESSURE: 150 MMHG | RESPIRATION RATE: 16 BRPM | WEIGHT: 211.2 LBS | TEMPERATURE: 97 F | HEART RATE: 85 BPM | BODY MASS INDEX: 42.24 KG/M2 | OXYGEN SATURATION: 99 % | DIASTOLIC BLOOD PRESSURE: 98 MMHG

## 2023-07-31 DIAGNOSIS — I15.2 HYPERTENSION DUE TO ENDOCRINE DISORDER: ICD-10-CM

## 2023-07-31 DIAGNOSIS — J02.9 ACUTE PHARYNGITIS, UNSPECIFIED ETIOLOGY: Primary | ICD-10-CM

## 2023-07-31 LAB — S PYO AG THROAT QL: NORMAL

## 2023-07-31 RX ORDER — AMOXICILLIN 500 MG/1
500 CAPSULE ORAL 2 TIMES DAILY
Qty: 20 CAPSULE | Refills: 0 | Status: SHIPPED | OUTPATIENT
Start: 2023-07-31 | End: 2023-08-10

## 2023-07-31 RX ORDER — LOSARTAN POTASSIUM 25 MG/1
25 TABLET ORAL DAILY
Qty: 30 TABLET | Refills: 5 | Status: SHIPPED | OUTPATIENT
Start: 2023-07-31

## 2023-07-31 NOTE — PROGRESS NOTES
Chief Complaint: Cough (Cough, chest congestion, sinus congestion, ear pain, ST from cough)       HPI:  Wendi Bolanos is a 20 y.o. female here with sore throat cough and congestion ongoing since 2 days. She has been having fever. She is unable to swallow due to the sore throat. She has been having her blood pressure. However started on propranolol she could not tolerate it and it gave her nausea. ROS:  Constitutional: Negative   HENT: As mentioned above  Respiratory: As mentioned above  Cardiovascular: Negative for chest pain, palpitations and leg swelling. Gastrointestinal: Negative   Genitourinary: Negative   Musculoskeletal: Negative   Patient's problem list, medications, allergies, past medical, surgical, social and family histories were reviewed and updated as appropriate.      Current Outpatient Medications   Medication Sig Dispense Refill    amoxicillin (AMOXIL) 500 MG capsule Take 1 capsule by mouth 2 times daily for 10 days 20 capsule 0    losartan (COZAAR) 25 MG tablet Take 1 tablet by mouth daily 30 tablet 5    Diapers & Supplies (HUGGIES PULL-UPS) MISC 1 each by Does not apply route 2 times daily as needed (diarrhea) 56 each 5    Blood Pressure KIT 1 Device by Does not apply route daily 1 kit 0    metFORMIN (GLUCOPHAGE-XR) 500 MG extended release tablet Take 2 tablets by mouth Take 2 in am,  2 in afternoon, and 2 in pm      levothyroxine (SYNTHROID) 125 MCG tablet Take 1 tablet by mouth daily      OMNITROPE 5 MG/1.5ML SOCT       oxytocin (PITOCIN) 10 UNIT/ML injection Inject into the muscle once      omeprazole (PRILOSEC) 10 MG delayed release capsule TAKE 1 CAPSULE ONE TIME EVERY NIGHT 90 capsule 0    escitalopram (LEXAPRO) 20 MG tablet Take 1 tablet by mouth daily      polyethylene glycol (GLYCOLAX) 17 GM/SCOOP powder Take 26 g by mouth daily 1 Bottle 1    magnesium hydroxide (MILK OF MAGNESIA) 400 MG/5ML suspension Take 30 mLs by mouth daily as needed for Constipation      acetaminophen

## 2023-08-03 ENCOUNTER — PATIENT MESSAGE (OUTPATIENT)
Dept: FAMILY MEDICINE CLINIC | Age: 23
End: 2023-08-03

## 2023-08-03 NOTE — TELEPHONE ENCOUNTER
From: Brunilda Angel  To: Dr. Munoz All: 8/3/2023 12:03 PM EDT  Subject: Follow up question    Hello this is the [address] for [facility]. When she is seen by you on the 21st can we also get a rx for home health for either nurse care or [facility]claim so that we can have them come in weekly for medication management, checking blood pressure and weight management. Can we get this set up?  Thank you Dc Parks

## 2023-08-10 ENCOUNTER — TELEPHONE (OUTPATIENT)
Dept: FAMILY MEDICINE CLINIC | Age: 23
End: 2023-08-10

## 2023-08-10 NOTE — TELEPHONE ENCOUNTER
----- Message from Mode Owusu sent at 8/9/2023 12:44 PM EDT -----  Subject: Message to Provider    QUESTIONS  Information for Provider? TEQUILAI? Keely Jara from [employer] will be stop by to drop off a form that needs completed no later   than 8/15/23 for the patient to begin a Day Program at [facility].   ---------------------------------------------------------------------------  --------------  Elroy Glenwood Markie  [phone]; OK to leave message on voicemail  ---------------------------------------------------------------------------  --------------  SCRIPT ANSWERS  Relationship to Patient? Covered Entity  Covered Entity Type? Other  Other Covered Entity Type? Support Services / Care Givers  Representative Name?  Keely Jara from [employer]   [phone]

## 2023-08-15 ENCOUNTER — TELEPHONE (OUTPATIENT)
Dept: FAMILY MEDICINE CLINIC | Age: 23
End: 2023-08-15

## 2023-08-15 NOTE — TELEPHONE ENCOUNTER
Terry Wu with [employer] called requesting assistance from provider. She is needing for provider to change the patient's 'Patient Capacity' to incapacitated as the patient is unable to make decisions for themselves and is fully dependent on their legal guardian. If there are any further questions, please call Shay Hernadez at [phone].

## 2023-08-15 NOTE — TELEPHONE ENCOUNTER
It was always like that as her legal guardian was making decisions. Where are they asking to change it.

## 2023-08-21 NOTE — TELEPHONE ENCOUNTER
[facility] has been informed & advised to p/u form from . No further action is needed at this time; thank you!

## 2023-08-21 NOTE — TELEPHONE ENCOUNTER
[employer] [facility] Form has been completed, scanned into media & sent to patient on [employer]. Pt advised to p/u original copy at  if she'd like. No further action is needed at this time; thank you!

## 2023-08-28 ENCOUNTER — OFFICE VISIT (OUTPATIENT)
Dept: FAMILY MEDICINE CLINIC | Age: 23
End: 2023-08-28

## 2023-08-28 VITALS
DIASTOLIC BLOOD PRESSURE: 78 MMHG | HEIGHT: 59 IN | WEIGHT: 213.4 LBS | SYSTOLIC BLOOD PRESSURE: 130 MMHG | OXYGEN SATURATION: 96 % | HEART RATE: 80 BPM | BODY MASS INDEX: 43.02 KG/M2 | TEMPERATURE: 98.8 F

## 2023-08-28 DIAGNOSIS — M25.562 ACUTE PAIN OF LEFT KNEE: Primary | ICD-10-CM

## 2023-08-28 DIAGNOSIS — I15.2 HYPERTENSION DUE TO ENDOCRINE DISORDER: ICD-10-CM

## 2023-08-28 RX ORDER — BLOOD PRESSURE TEST KIT
1 KIT MISCELLANEOUS DAILY
Qty: 1 KIT | Refills: 0 | Status: SHIPPED | OUTPATIENT
Start: 2023-08-28

## 2023-08-28 NOTE — PROGRESS NOTES
Chief Complaint: Knee Pain (left knee pain, intermittently for 6+ months; [facility]; increased pain once taken off PT (& had 1 more fall since d/c PT))       HPI:  Lisa Maya is a 20 y.o. 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 She was started on losartan 25 mg daily but she has not been taking and she states that she noticed leg swelling and hence she stopped. She is not checking her blood pressure. She has history of prediabetes and currently increased metformin extended release 500 mg 3 times a day. She has history of hypothyroidism currently taking Synthroid 125 mcg daily. And has been taking Lexapro 20 mg daily for anxiety. She does take Vistaril to help her anxiety. She is also getting Omnitrope  injections weekly and unsure of the dose and managed by endocrinologist and [facility]. She has Nexplanon. Her blood pressure is fluctuating somewhere between 606 systolic to 704. Denies any chest pain. Her recent blood work has been stable. Today before coming to the office she fell on her knee accidentally. And her left knee is bothering her and hurting her. Denies any swelling. Able to bear weight on her legs. ROS:  Constitutional: Negative   Respiratory: Negative   Cardiovascular: Negative   Gastrointestinal: Negative    Genitourinary: Negative   Musculoskeletal: As mentioned above  Skin: Negative for color change, pallor, rash and wound. Patient's problem list, medications, allergies, past medical, surgical, social and family histories were reviewed and updated as appropriate.      Current Outpatient Medications   Medication Sig Dispense Refill    Blood Pressure KIT 1 Device by Does not apply route daily 1 kit 0    levothyroxine (SYNTHROID) 125 MCG tablet

## 2023-09-13 ENCOUNTER — OFFICE VISIT (OUTPATIENT)
Dept: FAMILY MEDICINE CLINIC | Age: 23
End: 2023-09-13

## 2023-09-13 VITALS
BODY MASS INDEX: 43.72 KG/M2 | OXYGEN SATURATION: 97 % | SYSTOLIC BLOOD PRESSURE: 116 MMHG | DIASTOLIC BLOOD PRESSURE: 84 MMHG | WEIGHT: 218.6 LBS | TEMPERATURE: 96.7 F | HEART RATE: 82 BPM

## 2023-09-13 DIAGNOSIS — E23.0 FEMALE HYPOGONADOTROPIC HYPOGONADISM (HCC): ICD-10-CM

## 2023-09-13 DIAGNOSIS — H52.31 ANISOMETROPIA: ICD-10-CM

## 2023-09-13 DIAGNOSIS — Q87.11 PRADER-WILLI SYNDROME: ICD-10-CM

## 2023-09-13 DIAGNOSIS — B30.9 VIRAL CONJUNCTIVITIS OF LEFT EYE: Primary | ICD-10-CM

## 2023-09-13 DIAGNOSIS — E03.8 CENTRAL HYPOTHYROIDISM: ICD-10-CM

## 2023-09-13 DIAGNOSIS — H52.209 ASTIGMATISM, UNSPECIFIED LATERALITY, UNSPECIFIED TYPE: ICD-10-CM

## 2023-09-13 RX ORDER — OLOPATADINE HYDROCHLORIDE 1 MG/ML
1 SOLUTION/ DROPS OPHTHALMIC 2 TIMES DAILY
Qty: 5 ML | Refills: 0 | Status: SHIPPED | OUTPATIENT
Start: 2023-09-13 | End: 2023-11-02

## 2023-09-14 ENCOUNTER — TELEPHONE (OUTPATIENT)
Dept: FAMILY MEDICINE CLINIC | Age: 23
End: 2023-09-14

## 2023-09-14 DIAGNOSIS — E03.8 CENTRAL HYPOTHYROIDISM: ICD-10-CM

## 2023-09-14 DIAGNOSIS — Q87.11 PRADER-WILLI SYNDROME: Primary | ICD-10-CM

## 2023-09-14 NOTE — TELEPHONE ENCOUNTER
Jaren Bolanos from [facility] called requesting home health orders be placed for patient at guardian request.    Stated that they need someone to help check weight, blood pressure, and sugar on occasion. They will also need a referral to a home health company. Please e-mail referral to: [email or web address]

## 2023-09-14 NOTE — TELEPHONE ENCOUNTER
Office has been notified that pt is requiring Prior Authorization for the following medication:  Olopatadine Solution    Please initiate this request through CoverMyMeds, contacting the following Payor/Insurance: Thank you!

## 2023-09-18 NOTE — TELEPHONE ENCOUNTER
SUBMITTED PA FOR [facility] Olopatadine HCl 0.1% solution VIA CMM Key: [id number]  STATUS PENDING. FOLLOW UP DONE DAILY: IF NO RESPONSE IN 3 DAYS WE WILL REFAX FOR STATUS CHECK. IF ANOTHER 3 DAYS GOES BY WITH NO RESPONSE WILL CALL INSURANCE FOR STATUS.

## 2023-09-18 NOTE — TELEPHONE ENCOUNTER
Referral has been emailed to: [email or web address]    No further action is needed at this time; thank you!

## 2023-09-19 NOTE — TELEPHONE ENCOUNTER
The medication was DENIED; DENIAL letter uploaded to MEDIA. If you want an APPEAL; please note in this encounter what new information you would like to APPEAL with. Once complete route back to PA POOL. If this requires a response please respond to the pool ( P [phone]). Thank you please advise patient.

## 2023-09-25 ENCOUNTER — OFFICE VISIT (OUTPATIENT)
Dept: FAMILY MEDICINE CLINIC | Age: 23
End: 2023-09-25

## 2023-09-25 VITALS
DIASTOLIC BLOOD PRESSURE: 81 MMHG | HEIGHT: 59 IN | OXYGEN SATURATION: 96 % | SYSTOLIC BLOOD PRESSURE: 118 MMHG | HEART RATE: 90 BPM | WEIGHT: 218.4 LBS | BODY MASS INDEX: 44.03 KG/M2 | TEMPERATURE: 97 F

## 2023-09-25 DIAGNOSIS — E23.0 FEMALE HYPOGONADOTROPIC HYPOGONADISM (HCC): ICD-10-CM

## 2023-09-25 DIAGNOSIS — Q87.11 PRADER-WILLI SYNDROME: Primary | ICD-10-CM

## 2023-09-25 RX ORDER — ESTRADIOL 0.1 MG/D
FILM, EXTENDED RELEASE TRANSDERMAL
COMMUNITY
Start: 2023-09-13

## 2023-11-17 ENCOUNTER — TELEPHONE (OUTPATIENT)
Dept: FAMILY MEDICINE CLINIC | Age: 23
End: 2023-11-17

## 2023-11-17 NOTE — TELEPHONE ENCOUNTER
----- Message from Abdirahman Hawthorne sent at 11/17/2023 10:39 AM EST -----  Subject: Message to Provider    QUESTIONS  Information for Provider? [facility] needs updated med list   faxed to them as they are taking over her homecare- please fax to   [phone], call [phone] with any additional questions or concerns. ---------------------------------------------------------------------------  --------------  Manan Islas [facility]  [phone]; OK to leave message on voicemail  ---------------------------------------------------------------------------  --------------  SCRIPT ANSWERS  Relationship to Patient? Covered Entity  Covered Entity Type? Home Health Care?   Representative Name? [facility]

## 2023-11-28 ENCOUNTER — TELEPHONE (OUTPATIENT)
Dept: FAMILY MEDICINE CLINIC | Age: 23
End: 2023-11-28

## 2023-11-28 RX ORDER — LOSARTAN POTASSIUM 25 MG/1
25 TABLET ORAL DAILY
COMMUNITY
Start: 2023-11-20

## 2023-12-20 DIAGNOSIS — E03.8 CENTRAL HYPOTHYROIDISM: ICD-10-CM

## 2023-12-20 DIAGNOSIS — Q87.11 PRADER-WILLI SYNDROME: ICD-10-CM

## 2023-12-20 LAB — TSH SERPL DL<=0.005 MIU/L-ACNC: 1.99 UIU/ML (ref 0.27–4.2)

## 2024-02-05 ENCOUNTER — TELEPHONE (OUTPATIENT)
Dept: FAMILY MEDICINE CLINIC | Age: 24
End: 2024-02-05

## 2024-02-05 DIAGNOSIS — Q87.11 PRADER-WILLI SYNDROME: Primary | ICD-10-CM

## 2024-02-05 DIAGNOSIS — E23.0 FEMALE HYPOGONADOTROPIC HYPOGONADISM (HCC): ICD-10-CM

## 2024-02-06 NOTE — TELEPHONE ENCOUNTER
Referral has been faxed.     [facility]   [address], [address]   [place], [place], [address]  P: [phone]  F: [phone]

## 2024-03-13 ENCOUNTER — TELEPHONE (OUTPATIENT)
Dept: FAMILY MEDICINE CLINIC | Age: 24
End: 2024-03-13

## 2024-03-13 NOTE — TELEPHONE ENCOUNTER
Pt needs scheduled for Annual Wellness Visit and she is due any time. Left message for a callback. Please schedule her under the AWV.

## 2024-04-22 ENCOUNTER — CARE COORDINATION (OUTPATIENT)
Dept: CARE COORDINATION | Age: 24
End: 2024-04-22

## 2024-04-22 NOTE — CARE COORDINATION
RN-CC outreached patients guardian, Hoa Olivo. RNCC introduced self and role of care coordinator. Hoa states patient is doing well. She lives in an apartment but receives 24/7 care due to Prader Willi syndrome. Patient participating in programs. Hoa denies additional needs at this time and declined care coordination services. RNAnnabelCC advised Hoa outreach RNJANINE at [phone] or [facility] if future care coordination needs arise, Hoa andrews.

## 2024-06-24 ENCOUNTER — TELEPHONE (OUTPATIENT)
Dept: FAMILY MEDICINE CLINIC | Age: 24
End: 2024-06-24

## 2024-06-24 ENCOUNTER — OFFICE VISIT (OUTPATIENT)
Dept: FAMILY MEDICINE CLINIC | Age: 24
End: 2024-06-24
Payer: MEDICARE

## 2024-06-24 VITALS
DIASTOLIC BLOOD PRESSURE: 72 MMHG | HEART RATE: 82 BPM | HEIGHT: 59 IN | BODY MASS INDEX: 36.69 KG/M2 | SYSTOLIC BLOOD PRESSURE: 111 MMHG | TEMPERATURE: 97.3 F | WEIGHT: 182 LBS | OXYGEN SATURATION: 96 %

## 2024-06-24 DIAGNOSIS — H60.311 ACUTE DIFFUSE OTITIS EXTERNA OF RIGHT EAR: ICD-10-CM

## 2024-06-24 DIAGNOSIS — E23.0 FEMALE HYPOGONADOTROPIC HYPOGONADISM (HCC): ICD-10-CM

## 2024-06-24 DIAGNOSIS — R73.03 PREDIABETES: Primary | ICD-10-CM

## 2024-06-24 PROCEDURE — G8417 CALC BMI ABV UP PARAM F/U: HCPCS | Performed by: FAMILY MEDICINE

## 2024-06-24 PROCEDURE — G8427 DOCREV CUR MEDS BY ELIG CLIN: HCPCS | Performed by: FAMILY MEDICINE

## 2024-06-24 PROCEDURE — 99213 OFFICE O/P EST LOW 20 MIN: CPT | Performed by: FAMILY MEDICINE

## 2024-06-24 PROCEDURE — 4004F PT TOBACCO SCREEN RCVD TLK: CPT | Performed by: FAMILY MEDICINE

## 2024-06-24 PROCEDURE — 4130F TOPICAL PREP RX AOE: CPT | Performed by: FAMILY MEDICINE

## 2024-06-24 PROCEDURE — 83036 HEMOGLOBIN GLYCOSYLATED A1C: CPT | Performed by: FAMILY MEDICINE

## 2024-06-24 RX ORDER — SERTRALINE HYDROCHLORIDE 100 MG/1
100 TABLET, FILM COATED ORAL DAILY
COMMUNITY
Start: 2018-07-12

## 2024-06-24 RX ORDER — CLINDAMYCIN PHOSPHATE 10 UG/ML
LOTION TOPICAL 2 TIMES DAILY
COMMUNITY
Start: 2024-05-28

## 2024-06-24 RX ORDER — CIPROFLOXACIN/HYDROCORTISONE 0.2 %-1 %
3 SUSPENSION, DROPS(FINAL DOSAGE FORM)(ML) OTIC (EAR) 2 TIMES DAILY
Qty: 10 ML | Refills: 0 | Status: SHIPPED | OUTPATIENT
Start: 2024-06-24 | End: 2024-07-01

## 2024-06-24 RX ORDER — METFORMIN HYDROCHLORIDE 500 MG/1
500 TABLET, EXTENDED RELEASE ORAL 3 TIMES DAILY
COMMUNITY
Start: 2024-03-20 | End: 2024-06-24

## 2024-06-24 RX ORDER — CHOLECALCIFEROL (VITAMIN D3) 50 MCG
2000 TABLET ORAL DAILY
COMMUNITY
Start: 2024-06-06

## 2024-06-24 SDOH — ECONOMIC STABILITY: INCOME INSECURITY: HOW HARD IS IT FOR YOU TO PAY FOR THE VERY BASICS LIKE FOOD, HOUSING, MEDICAL CARE, AND HEATING?: NOT HARD AT ALL

## 2024-06-24 SDOH — ECONOMIC STABILITY: FOOD INSECURITY: WITHIN THE PAST 12 MONTHS, THE FOOD YOU BOUGHT JUST DIDN'T LAST AND YOU DIDN'T HAVE MONEY TO GET MORE.: NEVER TRUE

## 2024-06-24 SDOH — ECONOMIC STABILITY: FOOD INSECURITY: WITHIN THE PAST 12 MONTHS, YOU WORRIED THAT YOUR FOOD WOULD RUN OUT BEFORE YOU GOT MONEY TO BUY MORE.: NEVER TRUE

## 2024-06-24 ASSESSMENT — ANXIETY QUESTIONNAIRES
IF YOU CHECKED OFF ANY PROBLEMS ON THIS QUESTIONNAIRE, HOW DIFFICULT HAVE THESE PROBLEMS MADE IT FOR YOU TO DO YOUR WORK, TAKE CARE OF THINGS AT HOME, OR GET ALONG WITH OTHER PEOPLE: SOMEWHAT DIFFICULT
6. BECOMING EASILY ANNOYED OR IRRITABLE: SEVERAL DAYS
2. NOT BEING ABLE TO STOP OR CONTROL WORRYING: SEVERAL DAYS
3. WORRYING TOO MUCH ABOUT DIFFERENT THINGS: NOT AT ALL
5. BEING SO RESTLESS THAT IT IS HARD TO SIT STILL: NOT AT ALL
GAD7 TOTAL SCORE: 4
7. FEELING AFRAID AS IF SOMETHING AWFUL MIGHT HAPPEN: SEVERAL DAYS
1. FEELING NERVOUS, ANXIOUS, OR ON EDGE: SEVERAL DAYS
4. TROUBLE RELAXING: NOT AT ALL

## 2024-06-24 ASSESSMENT — PATIENT HEALTH QUESTIONNAIRE - PHQ9
SUM OF ALL RESPONSES TO PHQ9 QUESTIONS 1 & 2: 0
10. IF YOU CHECKED OFF ANY PROBLEMS, HOW DIFFICULT HAVE THESE PROBLEMS MADE IT FOR YOU TO DO YOUR WORK, TAKE CARE OF THINGS AT HOME, OR GET ALONG WITH OTHER PEOPLE: SOMEWHAT DIFFICULT
5. POOR APPETITE OR OVEREATING: NOT AT ALL
7. TROUBLE CONCENTRATING ON THINGS, SUCH AS READING THE NEWSPAPER OR WATCHING TELEVISION: NOT AT ALL
4. FEELING TIRED OR HAVING LITTLE ENERGY: NOT AT ALL
8. MOVING OR SPEAKING SO SLOWLY THAT OTHER PEOPLE COULD HAVE NOTICED. OR THE OPPOSITE, BEING SO FIGETY OR RESTLESS THAT YOU HAVE BEEN MOVING AROUND A LOT MORE THAN USUAL: NOT AT ALL
9. THOUGHTS THAT YOU WOULD BE BETTER OFF DEAD, OR OF HURTING YOURSELF: NOT AT ALL
3. TROUBLE FALLING OR STAYING ASLEEP: SEVERAL DAYS
1. LITTLE INTEREST OR PLEASURE IN DOING THINGS: NOT AT ALL
SUM OF ALL RESPONSES TO PHQ QUESTIONS 1-9: 1
2. FEELING DOWN, DEPRESSED OR HOPELESS: NOT AT ALL
6. FEELING BAD ABOUT YOURSELF - OR THAT YOU ARE A FAILURE OR HAVE LET YOURSELF OR YOUR FAMILY DOWN: NOT AT ALL

## 2024-06-24 NOTE — PROGRESS NOTES
Weight: 82.6 kg (182 lb)   Height: 1.506 m (4' 11.29\")     Body mass index is 36.4 kg/m².     Wt Readings from Last 3 Encounters:   06/24/24 82.6 kg (182 lb)   12/20/23 100.8 kg (222 lb 3.2 oz)   09/25/23 99.1 kg (218 lb 6.4 oz)     BP Readings from Last 3 Encounters:   06/24/24 111/72   12/20/23 139/89   09/25/23 118/81       Physical exam:  Constitutional: she is oriented to person, place, and time.she appears well-developed and well-nourished. No distress.   HENT:   Head: Normocephalic.   Right Ear: External ear normal.  Erythematous right tympanic membrane  Left Ear: External ear normal. Normal TM  Nose: Nose normal.   Mouth/Throat: Oropharynx is clear and moist. No oropharyngeal exudate.   Cardiovascular: Normal rate, regular rhythm, normal heart sounds and intact distal pulses.    Pulmonary/Chest: Effort normal and breath sounds normal.  Abdominal: Soft. Bowel sounds are normal. she exhibits no distension and no mass.       Assessment/Plan:   1. Prediabetes  Continue the metformin  - POCT glycosylated hemoglobin (Hb A1C)    2. Female hypogonadotropic hypogonadism (HCC)  Currently stable    3. Acute diffuse otitis externa of right ear  Prescribed Cortisporin eardrops.       Return in about 6 months (around 12/24/2024).      Gabriela Villar MD  6/24/2024 5:36 PM

## 2024-07-11 ENCOUNTER — TELEPHONE (OUTPATIENT)
Dept: FAMILY MEDICINE CLINIC | Age: 24
End: 2024-07-11

## 2024-07-11 NOTE — TELEPHONE ENCOUNTER
I spoke with Hoa from [facility] who reports patient is feeling better since earache.     No further action is needed at this time; thank you!

## 2024-07-11 NOTE — TELEPHONE ENCOUNTER
----- Message from Gabriela Villar MD sent at 7/11/2024 10:14 AM EDT -----  She needs an appointment if she is not feeling better after her ER visit    Dr Villar

## 2024-07-12 ENCOUNTER — TELEPHONE (OUTPATIENT)
Dept: FAMILY MEDICINE CLINIC | Age: 24
End: 2024-07-12

## 2024-07-12 NOTE — TELEPHONE ENCOUNTER
----- Message from Doc Lunasophy sent at 7/12/2024  9:14 AM EDT -----  Regarding: ECC Appointment Request  ECC Appointment Request    Patient needs appointment for ECC Appointment Type: ED Follow-Up.    Patient Requested Dates(s): No Tuesday and Thursday any other day is fine   Patient Requested Time: after 10   Provider Name: Gabriela Villar MD        Reason for Appointment Request: Established Patient - No appointments available during search  --------------------------------------------------------------------------------------------------------------------------    Relationship to Patient: Other     Call Back Information: OK to leave message on voicemail  Preferred Call Back Number: Phone [phone]

## 2024-07-12 NOTE — TELEPHONE ENCOUNTER
Patient scheduled.    Recent Visits  Date Type Provider Dept   06/24/24 Office Visit Gabriela Villar MD [facility]   12/20/23 Office Visit Gabriela Villar MD [facility]   09/25/23 Office Visit Gabriela Villar MD [facility]   09/13/23 Office Visit Silvana Smith MD [facility]   08/28/23 Office Visit Gabriela Villar MD [facility]   07/31/23 Office Visit Gbariela Villar MD [facility]   07/18/23 Office Visit Gabriela Villar MD [facility]   06/14/23 Office Visit Silvana Smith MD [facility]   04/19/23 Office Visit Gabriela Villar MD [facility]   03/16/23 Office Visit Gabriela Villar MD [facility]   Showing recent visits within past 540 days with a meds authorizing provider and meeting all other requirements  Future Appointments  No visits were found meeting these conditions.  Showing future appointments within next 150 days with a meds authorizing provider and meeting all other requirements

## 2024-07-15 ENCOUNTER — OFFICE VISIT (OUTPATIENT)
Dept: FAMILY MEDICINE CLINIC | Age: 24
End: 2024-07-15
Payer: MEDICARE

## 2024-07-15 VITALS
WEIGHT: 195.6 LBS | SYSTOLIC BLOOD PRESSURE: 128 MMHG | TEMPERATURE: 97.6 F | HEART RATE: 80 BPM | HEIGHT: 59 IN | DIASTOLIC BLOOD PRESSURE: 89 MMHG | BODY MASS INDEX: 39.43 KG/M2 | OXYGEN SATURATION: 97 %

## 2024-07-15 DIAGNOSIS — H66.003 NON-RECURRENT ACUTE SUPPURATIVE OTITIS MEDIA OF BOTH EARS WITHOUT SPONTANEOUS RUPTURE OF TYMPANIC MEMBRANES: Primary | ICD-10-CM

## 2024-07-15 PROCEDURE — G8427 DOCREV CUR MEDS BY ELIG CLIN: HCPCS | Performed by: FAMILY MEDICINE

## 2024-07-15 PROCEDURE — 99213 OFFICE O/P EST LOW 20 MIN: CPT | Performed by: FAMILY MEDICINE

## 2024-07-15 PROCEDURE — 4004F PT TOBACCO SCREEN RCVD TLK: CPT | Performed by: FAMILY MEDICINE

## 2024-07-15 PROCEDURE — G8417 CALC BMI ABV UP PARAM F/U: HCPCS | Performed by: FAMILY MEDICINE

## 2024-07-16 NOTE — PROGRESS NOTES
Chief Complaint: ED Follow-up (7/10/2024 at [facility] for otitis externa, left ear)       HPI:  Monica Fuller is a 23 y.o. female here here on the follow-up of bilateral ear infection.  She had acute otitis externa for which eardrops was prescribed.  However her symptoms did not resolve for which she went to ED and was treated with amoxicillin.  She has 1 more day left.  Her symptoms have resolved.  She has been getting recurrent infection.  She has been swimming and she would like swimming.  She he does not use any ear protectors.  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    ROS:  Constitutional: Negative   HENT:as mentioned above  Respiratory: Negative   Cardiovascular: Negative for chest pain, palpitations and leg swelling.   Gastrointestinal: Negative  Genitourinary: Negative for difficulty urinating, flank pain, frequency, hematuria and urgency.   Musculoskeletal: Negative  Skin: Negative for color change, pallor, rash and wound.   Neurological: Negative   Psychiatric/Behavioral: Negative     Patient's problem list, medications, allergies, past medical, surgical, social and family histories were reviewed and updated as appropriate.     Current Outpatient Medications   Medication Sig Dispense Refill    linaclotide (LINZESS) 290 MCG CAPS capsule Take 1 capsule by mouth daily      vitamin D (CHOLECALCIFEROL) 50 MCG (2000 UT) TABS tablet Take 1 tablet by mouth daily      clindamycin (CLEOCIN T) 1 % lotion Apply topically 2 times daily      sertraline (ZOLOFT) 100 MG tablet Take 1 tablet by mouth daily      Calcium Carbonate Antacid 1000 MG CHEW Take 1,000 mg by mouth daily as needed      escitalopram (LEXAPRO) 20 MG tablet Take 1 tablet by mouth daily 90 tablet 5    polyethylene glycol (GLYCOLAX) 17 GM/SCOOP powder Take 26 g by mouth daily 1

## 2024-07-23 ENCOUNTER — TELEMEDICINE (OUTPATIENT)
Dept: FAMILY MEDICINE CLINIC | Age: 24
End: 2024-07-23

## 2024-07-23 DIAGNOSIS — R09.81 NASAL CONGESTION: Primary | ICD-10-CM

## 2024-07-23 RX ORDER — HYDROXYZINE HYDROCHLORIDE 25 MG/1
25 TABLET, FILM COATED ORAL 2 TIMES DAILY
COMMUNITY

## 2024-07-23 NOTE — PROGRESS NOTES
2024    TELEHEALTH EVALUATION     HPI:    Monica Fuller (:  2000) has requested an audio/video evaluation for the following concern(s): .  She experienced nausea yesterday.Today her symptoms have resolved.  In the morning she felt congested.  Denies any sore throat.  Her pain in the ears are better.  Denies any other concern.  She is able to tolerate food.    Review of Systems:  Gen:  Denies fever, chills, headaches. No weight loss  HEENT: As mentioned above  CV:  Denies chest pain or tightness, palpitations.  Pulm:  Denies shortness of breath, cough.  Abd:  Denies abdominal pain, change in bowel habits.    Prior to Visit Medications    Medication Sig Taking? Authorizing Provider   hydrOXYzine HCl (ATARAX) 25 MG tablet Take 1 tablet by mouth in the morning and at bedtime Yes Meri Pearson MD   linaclotide (LINZESS) 290 MCG CAPS capsule Take 1 capsule by mouth daily Yes Meri Pearson MD   vitamin D (CHOLECALCIFEROL) 50 MCG (2000) TABS tablet Take 1 tablet by mouth daily Yes Meri Pearson MD   clindamycin (CLEOCIN T) 1 % lotion Apply topically 2 times daily Yes Meri Pearson MD   Calcium Carbonate Antacid 1000 MG CHEW Take 1,000 mg by mouth daily as needed Yes Meri Pearson MD   escitalopram (LEXAPRO) 20 MG tablet Take 1 tablet by mouth daily Yes Gabriela Villar MD   polyethylene glycol (GLYCOLAX) 17 GM/SCOOP powder Take 26 g by mouth daily Yes Gabriela Villar MD   magnesium hydroxide (MILK OF MAGNESIA) 400 MG/5ML suspension Take 30 mLs by mouth daily as needed for Constipation Yes Gabriela Villar MD   omeprazole (PRILOSEC) 10 MG delayed release capsule Take one daily Yes Gabriela Villar MD   estradiol (VIVELLE) 0.1 MG/24HR  Yes Meri Pearson MD   Blood Pressure KIT 1 Device by Does not apply route daily Yes Gabriela Villar MD   levothyroxine (SYNTHROID) 125 MCG tablet Take 1 tablet by mouth daily Yes Meri Pearson MD

## 2024-07-29 ENCOUNTER — OFFICE VISIT (OUTPATIENT)
Dept: FAMILY MEDICINE CLINIC | Age: 24
End: 2024-07-29
Payer: MEDICARE

## 2024-07-29 VITALS
DIASTOLIC BLOOD PRESSURE: 84 MMHG | BODY MASS INDEX: 37.69 KG/M2 | HEART RATE: 86 BPM | OXYGEN SATURATION: 98 % | WEIGHT: 192 LBS | HEIGHT: 60 IN | SYSTOLIC BLOOD PRESSURE: 132 MMHG

## 2024-07-29 DIAGNOSIS — Z00.00 ENCOUNTER FOR ANNUAL WELLNESS VISIT (AWV) IN MEDICARE PATIENT: ICD-10-CM

## 2024-07-29 DIAGNOSIS — E66.01 SEVERE OBESITY (BMI 35.0-39.9) WITH COMORBIDITY (HCC): ICD-10-CM

## 2024-07-29 DIAGNOSIS — N89.8 VAGINAL DISCHARGE: Primary | ICD-10-CM

## 2024-07-29 PROCEDURE — G0402 INITIAL PREVENTIVE EXAM: HCPCS | Performed by: FAMILY MEDICINE

## 2024-07-29 RX ORDER — FLUCONAZOLE 150 MG/1
150 TABLET ORAL ONCE
Qty: 1 TABLET | Refills: 0 | Status: SHIPPED | OUTPATIENT
Start: 2024-07-29 | End: 2024-07-29

## 2024-07-29 RX ORDER — NYSTATIN 100000 U/G
OINTMENT TOPICAL
Qty: 30 G | Refills: 3 | Status: SHIPPED | OUTPATIENT
Start: 2024-07-29

## 2024-07-29 ASSESSMENT — LIFESTYLE VARIABLES
HOW OFTEN DO YOU HAVE A DRINK CONTAINING ALCOHOL: MONTHLY OR LESS
HOW MANY STANDARD DRINKS CONTAINING ALCOHOL DO YOU HAVE ON A TYPICAL DAY: 1 OR 2

## 2024-07-29 ASSESSMENT — PATIENT HEALTH QUESTIONNAIRE - PHQ9: DEPRESSION UNABLE TO ASSESS: FUNCTIONAL CAPACITY MOTIVATION LIMITS ACCURACY

## 2024-07-29 NOTE — PROGRESS NOTES
Provider, MD Meri   OMNITROPE 5 MG/1.5ML SOCT  Yes ProviderMeri MD   oxytocin (PITOCIN) 10 UNIT/ML injection Inject into the muscle once Yes ProviderMeri MD   acetaminophen (TYLENOL) 325 MG tablet Take 2 tablets by mouth Yes ProviderMeri MD   Insulin Syringe-Needle U-100 31G X 5/16\" 0.3 ML MISC Use as directed to administer growth hormone medication daily Yes ProviderMeri MD       CareTeam (Including outside providers/suppliers regularly involved in providing care):   Patient Care Team:  Gabriela Villar MD as PCP - General (Family Medicine)  [facility]      Reviewed and updated this visit:  Allergies  Meds  Med Hx  Surg Hx  Soc Hx  Fam Hx

## 2024-07-30 LAB
CANDIDA DNA VAG QL NAA+PROBE: ABNORMAL
G VAGINALIS DNA SPEC QL NAA+PROBE: ABNORMAL
T VAGINALIS DNA VAG QL NAA+PROBE: ABNORMAL

## 2024-07-31 LAB
BACTERIA UR CULT: ABNORMAL
BACTERIA UR CULT: ABNORMAL
ORGANISM: ABNORMAL

## 2024-08-07 ENCOUNTER — TELEPHONE (OUTPATIENT)
Dept: FAMILY MEDICINE CLINIC | Age: 24
End: 2024-08-07

## 2024-08-07 NOTE — TELEPHONE ENCOUNTER
----- Message from Mamadou Fountain sent at 7/31/2024  2:24 PM EDT -----  Regarding: ECC Message to Provider  ECC Message to Provider    Relationship to Patient: Covered Entity      Additional Information : Covered Entity called in to request the form for [facility] that was being fill out by the PCP of the patient. She is asking if the Doctor of the patient can send it over through the Fax or email.    Fax Number : [phone]  [facility]  --------------------------------------------------------------------------------------------------------------------------    Call Back Information: OK to leave message on voicemail  Preferred Call Back Number: Phone [phone]

## 2024-08-13 ENCOUNTER — TELEMEDICINE (OUTPATIENT)
Dept: FAMILY MEDICINE CLINIC | Age: 24
End: 2024-08-13
Payer: MEDICARE

## 2024-08-13 DIAGNOSIS — N30.00 ACUTE CYSTITIS WITHOUT HEMATURIA: Primary | ICD-10-CM

## 2024-08-13 DIAGNOSIS — N30.00 ACUTE CYSTITIS WITHOUT HEMATURIA: ICD-10-CM

## 2024-08-13 DIAGNOSIS — K58.1 IRRITABLE BOWEL SYNDROME WITH CONSTIPATION: ICD-10-CM

## 2024-08-13 DIAGNOSIS — Q87.11 PRADER-WILLI SYNDROME: ICD-10-CM

## 2024-08-13 LAB
BACTERIA URNS QL MICRO: ABNORMAL /HPF
BILIRUB UR QL STRIP.AUTO: NEGATIVE
CLARITY UR: ABNORMAL
COLOR UR: YELLOW
EPI CELLS #/AREA URNS AUTO: 6 /HPF (ref 0–5)
GLUCOSE UR STRIP.AUTO-MCNC: >=1000 MG/DL
HGB UR QL STRIP.AUTO: ABNORMAL
HYALINE CASTS #/AREA URNS AUTO: 2 /LPF (ref 0–8)
KETONES UR STRIP.AUTO-MCNC: NEGATIVE MG/DL
LEUKOCYTE ESTERASE UR QL STRIP.AUTO: ABNORMAL
NITRITE UR QL STRIP.AUTO: NEGATIVE
PH UR STRIP.AUTO: 6.5 [PH] (ref 5–8)
PROT UR STRIP.AUTO-MCNC: 100 MG/DL
RBC CLUMPS #/AREA URNS AUTO: 162 /HPF (ref 0–4)
SP GR UR STRIP.AUTO: 1.04 (ref 1–1.03)
UA DIPSTICK W REFLEX MICRO PNL UR: YES
URN SPEC COLLECT METH UR: ABNORMAL
UROBILINOGEN UR STRIP-ACNC: 0.2 E.U./DL
WBC #/AREA URNS AUTO: 945 /HPF (ref 0–5)

## 2024-08-13 PROCEDURE — 99214 OFFICE O/P EST MOD 30 MIN: CPT | Performed by: FAMILY MEDICINE

## 2024-08-13 PROCEDURE — G8427 DOCREV CUR MEDS BY ELIG CLIN: HCPCS | Performed by: FAMILY MEDICINE

## 2024-08-13 RX ORDER — FLUCONAZOLE 150 MG/1
150 TABLET ORAL ONCE
Qty: 1 TABLET | Refills: 0 | Status: SHIPPED | OUTPATIENT
Start: 2024-08-13 | End: 2024-08-13

## 2024-08-13 RX ORDER — CEPHALEXIN 500 MG/1
500 CAPSULE ORAL 2 TIMES DAILY
Qty: 14 CAPSULE | Refills: 0 | Status: SHIPPED | OUTPATIENT
Start: 2024-08-13 | End: 2024-08-20

## 2024-08-13 NOTE — PROGRESS NOTES
TELEHEALTH EVALUATION -- Audio/Visual   Monica Fuller (:  2000) has requested to and consented to an audio/video evaluation for the concerns or medical issues discussed below.    Monica was evaluated through a synchronous (real-time) audio-video encounter. The patient (or guardian if applicable) is aware that this is a billable service, which includes applicable co-pays. This Virtual Visit was conducted with patient's (and/or legal guardian's) consent. The visit was conducted pursuant to the emergency declaration under the Poon Act and the National Emergencies Act, 1135 waiver authority and the Coronavirus Preparedness and Response Supplemental Appropriations Act.  Patient identification was verified, and a caregiver was present when appropriate.   The patient was located at Home: [address] [place] [address].   Provider was located at Home (Appt Dept State): [place].        Patient identification was verified at the start of the visit and patient has verbally consented to a telehealth visit: Yes    Total time spent on this encounter: Not billed by time.      Services were provided through a video synchronous discussion virtually to substitute for in-person clinic visit.       Monica Fuller   YOB: 2000    Date of Visit:  2024    Allergies   Allergen Reactions    Anthralin (Dithranol) Diarrhea    Azithromycin Diarrhea    Mosquito (Culex Pipiens) Allergy Skin Test      [facility] - [facility]     Outpatient Medications Marked as Taking for the 24 encounter (Telemedicine) with Maria Ines Cortez MD   Medication Sig Dispense Refill    cephALEXin (KEFLEX) 500 MG capsule Take 1 capsule by mouth 2 times daily for 7 days 14 capsule 0    fluconazole (DIFLUCAN) 150 MG tablet Take 1 tablet by mouth once for 1 dose May repeat in 5 days, if symptoms persist or recur. 1 tablet 0    nystatin (MYCOSTATIN) 528891 UNIT/GM ointment Apply topically 2 times daily. 30 g 3    hydrOXYzine HCl

## 2024-08-15 LAB
BACTERIA UR CULT: ABNORMAL
ORGANISM: ABNORMAL

## 2024-09-11 NOTE — TELEPHONE ENCOUNTER
I called in different ear drops and let her know  
LMOM advising patient of ear drop change   
Malena with [facility] stated that the prescription for the pt's Ceprol HC needs a prior authorization. Malena stated that if it is ok for the pt to have Ofloxacin 0.3% they will not need prior auth, but if the pt has to stay with the original prescription for Ceprol HC they will send over the prior authorization once notified. The Pharmacy contact is [phone].  
Please advise and please place new Rx  
4

## 2024-09-23 DIAGNOSIS — K59.04 CHRONIC IDIOPATHIC CONSTIPATION: ICD-10-CM

## 2024-09-27 RX ORDER — POLYETHYLENE GLYCOL 3350 17 G/17G
25.5 POWDER, FOR SOLUTION ORAL DAILY PRN
Qty: 1 EACH | Refills: 1 | Status: SHIPPED | OUTPATIENT
Start: 2024-09-27

## 2024-10-03 ENCOUNTER — OFFICE VISIT (OUTPATIENT)
Dept: FAMILY MEDICINE CLINIC | Age: 24
End: 2024-10-03

## 2024-10-03 VITALS
TEMPERATURE: 97.9 F | DIASTOLIC BLOOD PRESSURE: 89 MMHG | WEIGHT: 184.8 LBS | HEIGHT: 60 IN | SYSTOLIC BLOOD PRESSURE: 137 MMHG | BODY MASS INDEX: 36.28 KG/M2 | HEART RATE: 79 BPM | OXYGEN SATURATION: 97 %

## 2024-10-03 DIAGNOSIS — K59.09 OTHER CONSTIPATION: ICD-10-CM

## 2024-10-03 DIAGNOSIS — R11.0 NAUSEA: Primary | ICD-10-CM

## 2024-10-03 RX ORDER — SOMATROPIN 4 MG
KIT SUBCUTANEOUS
COMMUNITY
Start: 2024-09-19

## 2024-10-03 RX ORDER — OMEPRAZOLE 40 MG/1
40 CAPSULE, DELAYED RELEASE ORAL
Qty: 30 CAPSULE | Refills: 0 | Status: SHIPPED | OUTPATIENT
Start: 2024-10-03

## 2024-10-03 RX ORDER — ONDANSETRON 4 MG/1
4 TABLET, FILM COATED ORAL DAILY PRN
Qty: 20 TABLET | Refills: 0 | Status: SHIPPED | OUTPATIENT
Start: 2024-10-03

## 2024-10-03 RX ORDER — DOCUSATE SODIUM 100 MG/1
100 CAPSULE, LIQUID FILLED ORAL 2 TIMES DAILY
Qty: 10 CAPSULE | Refills: 0 | Status: SHIPPED | OUTPATIENT
Start: 2024-10-03 | End: 2024-10-13

## 2024-10-04 NOTE — PROGRESS NOTES
Chief Complaint: Nausea, Gastroesophageal Reflux, and Constipation       HPI:  Monica Fuller is a 24 y.o. female here with c/o nausea.  She has been feeling nauseous since yesterday.  She states she has been constipated.  Denies any vomiting.  Denies any abdominal pain currently.  She has history of GERD and has been taking Prilosec 10 mg daily.  Denies eating anything different today.    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    ROS:  Constitutional: Negative for  HENT: Negative   Respiratory: Negative   Cardiovascular: Negative  Gastrointestinal: As mentioned above  Genitourinary: Negative     Patient's problem list, medications, allergies, past medical, surgical, social and family histories were reviewed and updated as appropriate.     Current Outpatient Medications   Medication Sig Dispense Refill    SEROSTIM 4 MG SOLR       ondansetron (ZOFRAN) 4 MG tablet Take 1 tablet by mouth daily as needed for Nausea or Vomiting 20 tablet 0    docusate sodium (COLACE) 100 MG capsule Take 1 capsule by mouth 2 times daily for 10 days 10 capsule 0    omeprazole (PRILOSEC) 40 MG delayed release capsule Take 1 capsule by mouth every morning (before breakfast) 30 capsule 0    polyethylene glycol (GLYCOLAX) 17 GM/SCOOP powder Take 26 g by mouth daily as needed (CONSTIPATION) 1 each 1    nystatin (MYCOSTATIN) 326047 UNIT/GM ointment Apply topically 2 times daily. 30 g 3    hydrOXYzine HCl (ATARAX) 25 MG tablet Take 1 tablet by mouth in the morning and at bedtime      linaclotide (LINZESS) 290 MCG CAPS capsule Take 1 capsule by mouth daily      vitamin D (CHOLECALCIFEROL) 50 MCG (2000 UT) TABS tablet Take 1 tablet by mouth daily      clindamycin (CLEOCIN T) 1 % lotion Apply topically 2 times daily      Calcium Carbonate Antacid 1000 MG CHEW Take 1,000 mg by mouth daily as needed      escitalopram (LEXAPRO) 20 MG tablet Take 1

## 2024-10-21 ENCOUNTER — OFFICE VISIT (OUTPATIENT)
Dept: FAMILY MEDICINE CLINIC | Age: 24
End: 2024-10-21

## 2024-10-21 VITALS
SYSTOLIC BLOOD PRESSURE: 131 MMHG | OXYGEN SATURATION: 98 % | TEMPERATURE: 97.8 F | WEIGHT: 187 LBS | HEART RATE: 87 BPM | HEIGHT: 60 IN | DIASTOLIC BLOOD PRESSURE: 82 MMHG | BODY MASS INDEX: 36.71 KG/M2

## 2024-10-21 DIAGNOSIS — R19.7 DIARRHEA, UNSPECIFIED TYPE: Primary | ICD-10-CM

## 2024-10-21 DIAGNOSIS — R10.13 EPIGASTRIC PAIN: ICD-10-CM

## 2024-10-22 NOTE — PROGRESS NOTES
Chief Complaint: ED Follow-up ([facility] on 10/06/2024 for Constipation, unspecified constipation type (Primary Dx); Irritable bowel syndrome with constipation; Rectal prolapse) and Discuss Medications       HPI:  Monica Fuller is a 24 y.o. 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  She was in the ED on 10/6/2024.  She had CT abdomen which did show constipation and possible proctitis.  And she also has gallstones.  However she took MiraLAX now she has loose stools and acid reflux symptoms.  Denies any URI symptoms or vomiting.       ROS:  Constitutional: Negative   HENT: Negative   Eyes: Negative   Respiratory: Negative  Cardiovascular: Negative   Gastrointestinal: As mentioned above  Genitourinary: Negative     Patient's problem list, medications, allergies, past medical, surgical, social and family histories were reviewed and updated as appropriate.     Current Outpatient Medications   Medication Sig Dispense Refill    SEROSTIM 4 MG SOLR       ondansetron (ZOFRAN) 4 MG tablet Take 1 tablet by mouth daily as needed for Nausea or Vomiting 20 tablet 0    omeprazole (PRILOSEC) 40 MG delayed release capsule Take 1 capsule by mouth every morning (before breakfast) 30 capsule 0    polyethylene glycol (GLYCOLAX) 17 GM/SCOOP powder Take 26 g by mouth daily as needed (CONSTIPATION) 1 each 1    nystatin (MYCOSTATIN) 612607 UNIT/GM ointment Apply topically 2 times daily. 30 g 3    hydrOXYzine HCl (ATARAX) 25 MG tablet Take 1 tablet by mouth in the morning and at bedtime      linaclotide (LINZESS) 290 MCG CAPS capsule Take 1 capsule by mouth daily      vitamin D (CHOLECALCIFEROL) 50 MCG (2000 UT) TABS tablet Take 1 tablet by mouth daily      clindamycin (CLEOCIN T) 1 % lotion Apply topically 2 times daily      Calcium Carbonate Antacid 1000 MG CHEW Take 1,000 mg by mouth daily as needed

## 2024-11-11 ENCOUNTER — TELEPHONE (OUTPATIENT)
Dept: FAMILY MEDICINE CLINIC | Age: 24
End: 2024-11-11

## 2024-11-11 DIAGNOSIS — G47.30 SLEEP APNEA, UNSPECIFIED TYPE: Primary | ICD-10-CM

## 2024-11-11 DIAGNOSIS — R11.0 NAUSEA: ICD-10-CM

## 2024-11-11 RX ORDER — OMEPRAZOLE 40 MG/1
40 CAPSULE, DELAYED RELEASE ORAL DAILY PRN
Qty: 30 CAPSULE | Refills: 1 | Status: SHIPPED | OUTPATIENT
Start: 2024-11-11

## 2024-11-11 RX ORDER — OMEPRAZOLE 40 MG/1
40 CAPSULE, DELAYED RELEASE ORAL DAILY
Qty: 30 CAPSULE | Refills: 1 | Status: SHIPPED | OUTPATIENT
Start: 2024-11-11 | End: 2024-11-11 | Stop reason: SDUPTHER

## 2024-11-11 NOTE — TELEPHONE ENCOUNTER
Please advise, thank you!     Pt has been talking in her sleep and snoring loudly and wants to know if she can be checked for sleep apnea     SCRIPT CLARIFICATION    Unclear set of directions:     Medication:   Requested Prescriptions     Pending Prescriptions Disp Refills    omeprazole (PRILOSEC) 40 MG delayed release capsule 30 capsule 3     Sig: Take 1 capsule by mouth every morning (before breakfast)        Patient Phone Number: [phone] (home) [phone] (work)    Last appt: 10/21/2024   Next appt: Visit date not found    Last OARRS:        No data to display

## 2024-11-11 NOTE — TELEPHONE ENCOUNTER
Referral Details       Referred By   Referred To    Gabriela Villar MD   [address]   [address]   [facility] [address]   Phone: [phone]   Fax: [phone]    Diagnoses: Sleep apnea, unspecified type   Order: Galindo Lopez Md, Pulmonary, [facility]   Reason: Specialty Services Required    Galindo Gtz MD   [address] [address]   Phone: [phone]   Fax: [phone]             No further action is needed at this time; thank you!  Patient informed and demonstrated understanding.

## 2024-11-11 NOTE — TELEPHONE ENCOUNTER
Revised order need for following medication stating the 40mg needs specific instructions on how to use it and for how long to use it.      omeprazole (PRILOSEC) 40 MG delayed release capsule [[id number]]    Order Details  Dose: 40 mg Route: Oral Frequency: DAILY BEFORE BREAKFAST   Dispense Quantity: 30 capsule         Pt has been talking in her sleep and snoring loudly and wants to know if she can be checked for sleep apnea    Please advise

## 2024-12-02 ENCOUNTER — OFFICE VISIT (OUTPATIENT)
Dept: FAMILY MEDICINE CLINIC | Age: 24
End: 2024-12-02
Payer: MEDICARE

## 2024-12-02 ENCOUNTER — TELEPHONE (OUTPATIENT)
Dept: FAMILY MEDICINE CLINIC | Age: 24
End: 2024-12-02

## 2024-12-02 VITALS
TEMPERATURE: 97 F | OXYGEN SATURATION: 98 % | SYSTOLIC BLOOD PRESSURE: 128 MMHG | WEIGHT: 177.8 LBS | HEART RATE: 85 BPM | BODY MASS INDEX: 34.91 KG/M2 | HEIGHT: 60 IN | DIASTOLIC BLOOD PRESSURE: 74 MMHG

## 2024-12-02 DIAGNOSIS — J06.9 UPPER RESPIRATORY TRACT INFECTION, UNSPECIFIED TYPE: Primary | ICD-10-CM

## 2024-12-02 PROCEDURE — 4004F PT TOBACCO SCREEN RCVD TLK: CPT | Performed by: FAMILY MEDICINE

## 2024-12-02 PROCEDURE — G8417 CALC BMI ABV UP PARAM F/U: HCPCS | Performed by: FAMILY MEDICINE

## 2024-12-02 PROCEDURE — G8427 DOCREV CUR MEDS BY ELIG CLIN: HCPCS | Performed by: FAMILY MEDICINE

## 2024-12-02 PROCEDURE — G8484 FLU IMMUNIZE NO ADMIN: HCPCS | Performed by: FAMILY MEDICINE

## 2024-12-02 PROCEDURE — 99213 OFFICE O/P EST LOW 20 MIN: CPT | Performed by: FAMILY MEDICINE

## 2024-12-02 RX ORDER — AZITHROMYCIN 250 MG/1
TABLET, FILM COATED ORAL
Qty: 6 TABLET | Refills: 0 | Status: SHIPPED | OUTPATIENT
Start: 2024-12-02 | End: 2024-12-12

## 2024-12-02 RX ORDER — OXYTOCIN 10 [USP'U]/ML
10 INJECTION, SOLUTION INTRAMUSCULAR; INTRAVENOUS DAILY PRN
COMMUNITY

## 2024-12-02 RX ORDER — BROMPHENIRAMINE MALEATE, PSEUDOEPHEDRINE HYDROCHLORIDE, AND DEXTROMETHORPHAN HYDROBROMIDE 2; 30; 10 MG/5ML; MG/5ML; MG/5ML
SYRUP ORAL
COMMUNITY
Start: 2024-11-22 | End: 2024-12-02

## 2024-12-02 RX ORDER — AZITHROMYCIN 250 MG/1
TABLET, FILM COATED ORAL
Qty: 6 TABLET | Refills: 0 | Status: SHIPPED | OUTPATIENT
Start: 2024-12-02 | End: 2024-12-02

## 2024-12-02 NOTE — TELEPHONE ENCOUNTER
Pharmacy called requesting clarity on script....azithromycin (ZITHROMAX) 250 MG tablet [[id number]]    Order Details  Dose, Route, Frequency: As Directed   Dispense Quantity: 6 tablet Refills: 0          Simg on day 1 followed by 250mg on days 2 - 5         Stated that...pt has a listed allergy to Azithromycin and was prescribed a medication with this ingredient.Pharmacy does not know if it was a true allergy or whether to dispense it.       Please advise.     Pharmacy...[facility] [address] - P [phone] - F [phone]

## 2024-12-03 RX ORDER — AMOXICILLIN 500 MG/1
500 CAPSULE ORAL 2 TIMES DAILY
Qty: 20 CAPSULE | Refills: 0 | Status: SHIPPED | OUTPATIENT
Start: 2024-12-03 | End: 2024-12-04

## 2024-12-03 NOTE — PROGRESS NOTES
Chief Complaint: Cough, Pharyngitis, Ear Pain, and Fever       HPI:Monica Fuller is a 24 y.o. 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  She had the symptoms since 3 days and she went to the urgent care.  She was tested negative for flu and COVID.  They advised her on symptomatic treatment.  However her symptoms are getting worse.  She is unable to swallow and has been coughing.  Her pain in the ears has gotten worse.  She still continues to have fever.  Denies any nausea or vomiting.      ROS:  Constitutional: Negative   HENT: As mentioned above  Respiratory: As mentioned above  Cardiovascular: Negative   Gastrointestinal: Negative   Genitourinary: Negative     Patient's problem list, medications, allergies, past medical, surgical, social and family histories were reviewed and updated as appropriate.     Current Outpatient Medications   Medication Sig Dispense Refill    melatonin 3 MG TABS tablet Take 1 tablet by mouth nightly as needed (sleep)      oxytocin (PITOCIN) 10 UNIT/ML injection 1 mL by Other route daily as needed (hunger)      azithromycin (ZITHROMAX) 250 MG tablet 500mg on day 1 followed by 250mg on days 2 - 5 6 tablet 0    omeprazole (PRILOSEC) 40 MG delayed release capsule Take 1 capsule by mouth daily as needed (nausea/vomiting) 30 capsule 1    SEROSTIM 4 MG SOLR       ondansetron (ZOFRAN) 4 MG tablet Take 1 tablet by mouth daily as needed for Nausea or Vomiting 20 tablet 0    polyethylene glycol (GLYCOLAX) 17 GM/SCOOP powder Take 26 g by mouth daily as needed (CONSTIPATION) 1 each 1    nystatin (MYCOSTATIN) 078688 UNIT/GM ointment Apply topically 2 times daily. 30 g 3    hydrOXYzine HCl (ATARAX) 25 MG tablet Take 1 tablet by mouth in the morning and at bedtime      linaclotide (LINZESS) 290 MCG CAPS capsule Take 1 capsule by mouth daily      vitamin D

## 2024-12-05 NOTE — TELEPHONE ENCOUNTER
Initially sent for azithromycin as she said she has no allergic reaction except for diarrhea.  The pharmacist called and stated she is allergic reaction so I DID NOT WANT TO CHANCE and hence sent the antibiotic amoxicillin to her pharmacy.  Please ask her to take only if her symptoms still persists and if she is feeling better advise her not to take it.

## 2024-12-05 NOTE — TELEPHONE ENCOUNTER
Please advise, thank you!     Unsure what Tom is referring to. Do you know anything about this Dr. Villar?

## 2024-12-06 ENCOUNTER — TELEPHONE (OUTPATIENT)
Dept: FAMILY MEDICINE CLINIC | Age: 24
End: 2024-12-06

## 2024-12-06 NOTE — TELEPHONE ENCOUNTER
First take polyethylene glycol (GLYCOLAX) 17 GM/SCOOP powder then take magnesium hydroxide (MILK OF MAGNESIA) 400 MG/5ML suspension if still no relief then take Senna glycoside. Linaclotide (LINZESS) 290 MCG CAPS capsule is taken daily.

## 2024-12-06 NOTE — TELEPHONE ENCOUNTER
I spoke with [facility] to confirm medications.   No further action is needed at this time; thank you!

## 2024-12-16 ASSESSMENT — SLEEP AND FATIGUE QUESTIONNAIRES
HOW LIKELY ARE YOU TO NOD OFF OR FALL ASLEEP WHILE SITTING INACTIVE IN A PUBLIC PLACE: HIGH CHANCE OF DOZING
HOW LIKELY ARE YOU TO NOD OFF OR FALL ASLEEP WHILE SITTING AND READING: MODERATE CHANCE OF DOZING
HOW LIKELY ARE YOU TO NOD OFF OR FALL ASLEEP WHEN YOU ARE A PASSENGER IN A CAR FOR AN HOUR WITHOUT A BREAK: HIGH CHANCE OF DOZING
HOW LIKELY ARE YOU TO NOD OFF OR FALL ASLEEP WHILE SITTING INACTIVE IN A PUBLIC PLACE: HIGH CHANCE OF DOZING
HOW LIKELY ARE YOU TO NOD OFF OR FALL ASLEEP WHEN YOU ARE A PASSENGER IN A CAR FOR AN HOUR WITHOUT A BREAK: HIGH CHANCE OF DOZING
HOW LIKELY ARE YOU TO NOD OFF OR FALL ASLEEP WHILE LYING DOWN TO REST IN THE AFTERNOON WHEN CIRCUMSTANCES PERMIT: HIGH CHANCE OF DOZING
HOW LIKELY ARE YOU TO NOD OFF OR FALL ASLEEP IN A CAR, WHILE STOPPED FOR A FEW MINUTES IN TRAFFIC: MODERATE CHANCE OF DOZING
ESS TOTAL SCORE: 18
HOW LIKELY ARE YOU TO NOD OFF OR FALL ASLEEP WHILE SITTING QUIETLY AFTER LUNCH WITHOUT ALCOHOL: MODERATE CHANCE OF DOZING
HOW LIKELY ARE YOU TO NOD OFF OR FALL ASLEEP WHILE SITTING AND TALKING TO SOMEONE: SLIGHT CHANCE OF DOZING
HOW LIKELY ARE YOU TO NOD OFF OR FALL ASLEEP WHILE SITTING AND TALKING TO SOMEONE: SLIGHT CHANCE OF DOZING
HOW LIKELY ARE YOU TO NOD OFF OR FALL ASLEEP WHILE SITTING AND READING: MODERATE CHANCE OF DOZING
HOW LIKELY ARE YOU TO NOD OFF OR FALL ASLEEP IN A CAR, WHILE STOPPED FOR A FEW MINUTES IN TRAFFIC: MODERATE CHANCE OF DOZING
HOW LIKELY ARE YOU TO NOD OFF OR FALL ASLEEP WHILE WATCHING TV: MODERATE CHANCE OF DOZING
HOW LIKELY ARE YOU TO NOD OFF OR FALL ASLEEP WHILE WATCHING TV: MODERATE CHANCE OF DOZING
HOW LIKELY ARE YOU TO NOD OFF OR FALL ASLEEP WHILE SITTING QUIETLY AFTER LUNCH WITHOUT ALCOHOL: MODERATE CHANCE OF DOZING

## 2024-12-17 ENCOUNTER — OFFICE VISIT (OUTPATIENT)
Dept: PULMONOLOGY | Age: 24
End: 2024-12-17
Payer: MEDICARE

## 2024-12-17 VITALS
SYSTOLIC BLOOD PRESSURE: 118 MMHG | OXYGEN SATURATION: 95 % | WEIGHT: 167.6 LBS | BODY MASS INDEX: 32.9 KG/M2 | HEIGHT: 60 IN | DIASTOLIC BLOOD PRESSURE: 70 MMHG | HEART RATE: 74 BPM

## 2024-12-17 DIAGNOSIS — E66.09 CLASS 1 OBESITY DUE TO EXCESS CALORIES WITHOUT SERIOUS COMORBIDITY WITH BODY MASS INDEX (BMI) OF 33.0 TO 33.9 IN ADULT: ICD-10-CM

## 2024-12-17 DIAGNOSIS — G47.33 OSA (OBSTRUCTIVE SLEEP APNEA): Primary | ICD-10-CM

## 2024-12-17 DIAGNOSIS — R06.83 SNORING: ICD-10-CM

## 2024-12-17 DIAGNOSIS — E66.811 CLASS 1 OBESITY DUE TO EXCESS CALORIES WITHOUT SERIOUS COMORBIDITY WITH BODY MASS INDEX (BMI) OF 33.0 TO 33.9 IN ADULT: ICD-10-CM

## 2024-12-17 PROCEDURE — 99204 OFFICE O/P NEW MOD 45 MIN: CPT | Performed by: INTERNAL MEDICINE

## 2024-12-17 PROCEDURE — G8484 FLU IMMUNIZE NO ADMIN: HCPCS | Performed by: INTERNAL MEDICINE

## 2024-12-17 PROCEDURE — G8427 DOCREV CUR MEDS BY ELIG CLIN: HCPCS | Performed by: INTERNAL MEDICINE

## 2024-12-17 PROCEDURE — G8417 CALC BMI ABV UP PARAM F/U: HCPCS | Performed by: INTERNAL MEDICINE

## 2024-12-17 PROCEDURE — 1036F TOBACCO NON-USER: CPT | Performed by: INTERNAL MEDICINE

## 2024-12-17 NOTE — PROGRESS NOTES
from weight loss    3. Snoring  Snoring is likely due to RISHABH.  If sleep study is negative then patient will need evaluation for upper airway resistance syndrome.        Return in about 3 months (around 3/17/2025).           Maria C Tobin MD  Pulmonary Critical Care and Sleep Medicine  Electronically signed by Maria C Tobin MD on 12/17/2024 at 3:26 PM     This note was completed using dragon medical speech recognition software. Grammatical errors, random word insertions, pronoun errors and incomplete sentences are occasional consequences of this technology due to software limitations. If there are questions or concerns about the content of this note of information contained within the body of this dictation they should be addressed with the provider for clarification.

## 2024-12-18 ENCOUNTER — TELEPHONE (OUTPATIENT)
Dept: SLEEP CENTER | Age: 24
End: 2024-12-18

## 2024-12-18 NOTE — TELEPHONE ENCOUNTER
Called to schedule a psg per Mahad     Called the # given [phone] (Nancy)    Left vm for the pt to return my call     [employer] medic[employer] insurance

## 2025-01-02 DIAGNOSIS — K59.04 CHRONIC IDIOPATHIC CONSTIPATION: ICD-10-CM

## 2025-01-02 DIAGNOSIS — G47.30 SLEEP APNEA, UNSPECIFIED TYPE: ICD-10-CM

## 2025-01-02 DIAGNOSIS — R11.0 NAUSEA: ICD-10-CM

## 2025-01-02 RX ORDER — POLYETHYLENE GLYCOL 3350 17 G/17G
25.5 POWDER, FOR SOLUTION ORAL DAILY PRN
Qty: 1 EACH | Refills: 1 | Status: SHIPPED | OUTPATIENT
Start: 2025-01-02

## 2025-01-02 RX ORDER — OMEPRAZOLE 40 MG/1
40 CAPSULE, DELAYED RELEASE ORAL DAILY PRN
Qty: 30 CAPSULE | Refills: 1 | Status: SHIPPED | OUTPATIENT
Start: 2025-01-02

## 2025-01-02 NOTE — TELEPHONE ENCOUNTER
Patient requesting refill of...polyethylene glycol (GLYCOLAX) 17 GM/SCOOP powder [[id number]]    Order Details    Dose: 26 g- This is the dosage needed Route: Oral Frequency: DAILY PRN for CONSTIPATION   Dispense Quantity: 1 each Refills: 1            omeprazole (PRILOSEC) 40 MG delayed release capsule [[id number]]    Order Details  Dose: 40 mg Route: Oral Frequency: DAILY PRN for nausea/vomiting   Dispense Quantity: 30 capsule Refills: 1    Note to Pharmacy: Take Omeprazole 10 MG 1x daily with 40 MG 1x daily PRN ONLY for nausea/vomiting     Last visit date: 12/2/2024    Pharmacy...[facility] [address] - P [phone] - F [phone]

## 2025-01-02 NOTE — TELEPHONE ENCOUNTER
Medication:   Requested Prescriptions     Pending Prescriptions Disp Refills    omeprazole (PRILOSEC) 40 MG delayed release capsule 30 capsule 1     Sig: Take 1 capsule by mouth daily as needed (nausea/vomiting)    polyethylene glycol (GLYCOLAX) 17 GM/SCOOP powder 1 each 1     Sig: Take 26 g by mouth daily as needed (CONSTIPATION)        Last Filled:      Patient Phone Number: [phone] (home) [phone] (work)    Last appt: 12/2/2024   Next appt: Visit date not found    Last OARRS:        No data to display

## 2025-02-17 ENCOUNTER — HOSPITAL ENCOUNTER (OUTPATIENT)
Dept: SLEEP CENTER | Age: 25
Discharge: HOME | End: 2025-02-17
Payer: MEDICARE

## 2025-02-17 DIAGNOSIS — G47.33 OSA (OBSTRUCTIVE SLEEP APNEA): ICD-10-CM

## 2025-02-17 DIAGNOSIS — F41.1 GENERALIZED ANXIETY DISORDER: ICD-10-CM

## 2025-02-17 PROCEDURE — 95810 POLYSOM 6/> YRS 4/> PARAM: CPT

## 2025-02-17 RX ORDER — ESCITALOPRAM OXALATE 20 MG/1
20 TABLET ORAL DAILY
Qty: 90 TABLET | Refills: 2 | Status: SHIPPED | OUTPATIENT
Start: 2025-02-17

## 2025-02-17 NOTE — TELEPHONE ENCOUNTER
Medication:   Requested Prescriptions     Pending Prescriptions Disp Refills    escitalopram (LEXAPRO) 20 MG tablet [Pharmacy Med Name: ESCITALOPRAM OXALATE 20MG TABLET] 90 tablet 2     Sig: TAKE 1 TABLET BY MOUTH DAILY        Last Filled:  12/20/2023 #[id number]    Patient Phone Number: [phone] (home) [phone] (work)    Last appt: 12/2/2024   Next appt: 8/4/2025    Last OARRS:        No data to display

## 2025-02-18 DIAGNOSIS — G47.33 OSA (OBSTRUCTIVE SLEEP APNEA): Primary | ICD-10-CM

## 2025-02-19 ENCOUNTER — TELEPHONE (OUTPATIENT)
Dept: PULMONOLOGY | Age: 25
End: 2025-02-19

## 2025-02-19 NOTE — TELEPHONE ENCOUNTER
PSG Showed pt is consistent with moderate RISHABH. AHI 22.3/Hr. Lowest SaO2 seen was 54% with time below 89% of 29.3 min. Trial recommended of CPAP/Bilevel Titration.     Pt may need overnight pulse oximetry testing to qualify for nocturnal O2.     LM for legal guardian to return my call. Also tried calling pt and LM but couldn't LM due to not being set up.

## 2025-02-20 NOTE — TELEPHONE ENCOUNTER
Tried calling Monica and her guardian again today. LM for guardian but could not get a vm to LM for Pt.

## 2025-02-21 NOTE — TELEPHONE ENCOUNTER
Spoke to Nancy, Emergency contact and Caregiver. Was able to give her results for PSG. She wants sleep lab to call her to schedule the Titration study. Will forward to Mallory Blake to schedule.

## 2025-03-17 ENCOUNTER — HOSPITAL ENCOUNTER (OUTPATIENT)
Dept: SLEEP CENTER | Age: 25
Discharge: HOME | End: 2025-03-17
Payer: MEDICARE

## 2025-03-17 DIAGNOSIS — G47.33 OSA (OBSTRUCTIVE SLEEP APNEA): ICD-10-CM

## 2025-03-17 PROCEDURE — 95811 POLYSOM 6/>YRS CPAP 4/> PARM: CPT

## 2025-03-27 ENCOUNTER — TELEPHONE (OUTPATIENT)
Dept: PULMONOLOGY | Age: 25
End: 2025-03-27

## 2025-03-27 NOTE — TELEPHONE ENCOUNTER
Nancy called stating that patient failed at her cpap mask trial due to patient stating that she didn't want to wear the mask and that it was uncomfortable. Nancy stated that they decided not to go on with the trial. She wants to know what are other options for her?    PH:[phone]   Office: [phone]

## 2025-03-28 NOTE — TELEPHONE ENCOUNTER
Spoke to caregiver and she stated she has sensory issues and couldn't go through with titration study due to not being able to tolerate any kind of things on her face. She stated that Dr Tobin had said there may be other options. I told her that she should probably follow up in office with Dr Tobin to go over this. She will come in next Friday to talk this over.

## 2025-04-02 RX ORDER — ESTRADIOL 0.1 MG/D
PATCH TRANSDERMAL
COMMUNITY
Start: 2025-03-17

## 2025-04-02 RX ORDER — SULFACETAMIDE SODIUM 100 MG/ML
2 SOLUTION/ DROPS OPHTHALMIC
COMMUNITY
Start: 2025-01-15

## 2025-04-02 RX ORDER — CETIRIZINE HYDROCHLORIDE 10 MG/1
TABLET ORAL
COMMUNITY
Start: 2025-03-24

## 2025-04-02 RX ORDER — SEMAGLUTIDE 0.68 MG/ML
0.25 INJECTION, SOLUTION SUBCUTANEOUS WEEKLY
COMMUNITY
Start: 2025-02-06

## 2025-04-03 ENCOUNTER — TELEPHONE (OUTPATIENT)
Dept: FAMILY MEDICINE CLINIC | Age: 25
End: 2025-04-03

## 2025-04-03 ENCOUNTER — HOSPITAL ENCOUNTER (OUTPATIENT)
Dept: GENERAL RADIOLOGY | Age: 25
Discharge: HOME | End: 2025-04-03
Payer: MEDICARE

## 2025-04-03 ENCOUNTER — OFFICE VISIT (OUTPATIENT)
Dept: FAMILY MEDICINE CLINIC | Age: 25
End: 2025-04-03

## 2025-04-03 VITALS
SYSTOLIC BLOOD PRESSURE: 133 MMHG | HEART RATE: 79 BPM | HEIGHT: 60 IN | WEIGHT: 167 LBS | BODY MASS INDEX: 32.79 KG/M2 | DIASTOLIC BLOOD PRESSURE: 85 MMHG | TEMPERATURE: 98.4 F | OXYGEN SATURATION: 100 %

## 2025-04-03 DIAGNOSIS — Q87.11 PRADER-WILLI SYNDROME: ICD-10-CM

## 2025-04-03 DIAGNOSIS — H53.8 BLURRY VISION, BILATERAL: ICD-10-CM
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
## 2025-04-03 DIAGNOSIS — Q87.11 PRADER-WILLI SYNDROME: Primary | ICD-10-CM

## 2025-04-03 DIAGNOSIS — M25.561 ACUTE PAIN OF RIGHT KNEE: ICD-10-CM

## 2025-04-03 PROCEDURE — 73560 X-RAY EXAM OF KNEE 1 OR 2: CPT

## 2025-04-03 RX ORDER — GLUCOSAMINE HCL/CHONDROITIN SU 500-400 MG
CAPSULE ORAL
Qty: 30 STRIP | Refills: 0 | Status: SHIPPED | OUTPATIENT
Start: 2025-04-03

## 2025-04-03 RX ORDER — AVOBENZONE, HOMOSALATE, OCTISALATE, OCTOCRYLENE 30; 40; 45; 26 MG/ML; MG/ML; MG/ML; MG/ML
1 CREAM TOPICAL 2 TIMES DAILY
Qty: 30 EACH | Refills: 1 | Status: SHIPPED | OUTPATIENT
Start: 2025-04-03

## 2025-04-03 RX ORDER — BLOOD-GLUCOSE METER
1 KIT MISCELLANEOUS ONCE
Qty: 1 KIT | Refills: 0 | Status: SHIPPED | OUTPATIENT
Start: 2025-04-03 | End: 2025-04-04

## 2025-04-03 RX ORDER — MULTIVIT-MIN/IRON FUM/FOLIC AC 7.5 MG-4
1 TABLET ORAL DAILY
Qty: 30 TABLET | Refills: 3 | Status: SHIPPED | OUTPATIENT
Start: 2025-04-03

## 2025-04-03 SDOH — ECONOMIC STABILITY: FOOD INSECURITY: WITHIN THE PAST 12 MONTHS, YOU WORRIED THAT YOUR FOOD WOULD RUN OUT BEFORE YOU GOT MONEY TO BUY MORE.: NEVER TRUE

## 2025-04-03 SDOH — ECONOMIC STABILITY: FOOD INSECURITY: WITHIN THE PAST 12 MONTHS, THE FOOD YOU BOUGHT JUST DIDN'T LAST AND YOU DIDN'T HAVE MONEY TO GET MORE.: NEVER TRUE

## 2025-04-03 ASSESSMENT — PATIENT HEALTH QUESTIONNAIRE - PHQ9
1. LITTLE INTEREST OR PLEASURE IN DOING THINGS: NOT AT ALL
SUM OF ALL RESPONSES TO PHQ QUESTIONS 1-9: 0
2. FEELING DOWN, DEPRESSED OR HOPELESS: NOT AT ALL
SUM OF ALL RESPONSES TO PHQ QUESTIONS 1-9: 0

## 2025-04-03 NOTE — TELEPHONE ENCOUNTER
Nancy Gonsales a Home Health provider called in and had questions about pt's blood glucose. She wanted to know when she should be checking if its everyday or when she's having an episode. She wanted to talk to someone about this.     Please advise

## 2025-04-04 ENCOUNTER — OFFICE VISIT (OUTPATIENT)
Dept: PULMONOLOGY | Age: 25
End: 2025-04-04
Payer: MEDICARE

## 2025-04-04 VITALS
DIASTOLIC BLOOD PRESSURE: 80 MMHG | OXYGEN SATURATION: 100 % | SYSTOLIC BLOOD PRESSURE: 120 MMHG | HEIGHT: 60 IN | BODY MASS INDEX: 32.63 KG/M2 | WEIGHT: 166.2 LBS | HEART RATE: 82 BPM

## 2025-04-04 DIAGNOSIS — E66.811 CLASS 1 OBESITY DUE TO EXCESS CALORIES WITHOUT SERIOUS COMORBIDITY WITH BODY MASS INDEX (BMI) OF 33.0 TO 33.9 IN ADULT: ICD-10-CM

## 2025-04-04 DIAGNOSIS — G47.33 OSA (OBSTRUCTIVE SLEEP APNEA): Primary | ICD-10-CM

## 2025-04-04 DIAGNOSIS — E66.09 CLASS 1 OBESITY DUE TO EXCESS CALORIES WITHOUT SERIOUS COMORBIDITY WITH BODY MASS INDEX (BMI) OF 33.0 TO 33.9 IN ADULT: ICD-10-CM

## 2025-04-04 PROCEDURE — G8427 DOCREV CUR MEDS BY ELIG CLIN: HCPCS | Performed by: INTERNAL MEDICINE

## 2025-04-04 PROCEDURE — 99214 OFFICE O/P EST MOD 30 MIN: CPT | Performed by: INTERNAL MEDICINE

## 2025-04-04 PROCEDURE — G2211 COMPLEX E/M VISIT ADD ON: HCPCS | Performed by: INTERNAL MEDICINE

## 2025-04-04 PROCEDURE — G8417 CALC BMI ABV UP PARAM F/U: HCPCS | Performed by: INTERNAL MEDICINE

## 2025-04-04 PROCEDURE — 1036F TOBACCO NON-USER: CPT | Performed by: INTERNAL MEDICINE

## 2025-04-04 NOTE — PROGRESS NOTES
Vitamins-Minerals (MULTIVITAMIN WITH MINERALS) tablet Take 1 tablet by mouth daily 30 tablet 3    Calcium Carbonate Antacid 1000 MG CHEW Take 1,000 mg by mouth daily 90 tablet 0    estradiol (CLIMARA) 0.1 MG/24HR       sulfacetamide (BLEPH-10) 10 % ophthalmic solution Apply 2 drops to eye      linaclotide (LINZESS) 290 MCG CAPS capsule Take 1 capsule by mouth daily      cetirizine (ZYRTEC) 10 MG tablet       OZEMPIC, 0.25 OR 0.5 MG/DOSE, 2 MG/3ML SOPN Inject 0.25 mg into the skin once a week      escitalopram (LEXAPRO) 20 MG tablet TAKE 1 TABLET BY MOUTH DAILY 90 tablet 2    omeprazole (PRILOSEC) 40 MG delayed release capsule Take 1 capsule by mouth daily as needed (nausea/vomiting) 30 capsule 1    polyethylene glycol (GLYCOLAX) 17 GM/SCOOP powder Take 26 g by mouth daily as needed (CONSTIPATION) 1 each 1    melatonin 3 MG TABS tablet Take 1 tablet by mouth nightly as needed (sleep)      oxytocin (PITOCIN) 10 UNIT/ML injection 1 mL by Other route daily as needed (hunger)      SEROSTIM 4 MG SOLR       ondansetron (ZOFRAN) 4 MG tablet Take 1 tablet by mouth daily as needed for Nausea or Vomiting 20 tablet 0    hydrOXYzine HCl (ATARAX) 25 MG tablet Take 1 tablet by mouth in the morning and at bedtime      vitamin D (CHOLECALCIFEROL) 50 MCG (2000 UT) TABS tablet Take 1 tablet by mouth daily      magnesium hydroxide (MILK OF MAGNESIA) 400 MG/5ML suspension Take 30 mLs by mouth daily as needed for Constipation      estradiol (VIVELLE) 0.1 MG/24HR       Blood Pressure KIT 1 Device by Does not apply route daily 1 kit 0    levothyroxine (SYNTHROID) 125 MCG tablet Take 1 tablet by mouth daily      acetaminophen (TYLENOL) 325 MG tablet Take 2 tablets by mouth      Insulin Syringe-Needle U-100 31G X 5/16\" 0.3 ML MISC Use as directed to administer growth hormone medication daily       No current facility-administered medications for this visit.       Social History     Tobacco Use    Smoking status: Former     Current packs/day:

## 2025-04-04 NOTE — PROGRESS NOTES
PULMONARY OFFICE FOLLOW UP NOTE    REASON FOR VISIT:   Chief Complaint   Patient presents with    Sleep Apnea     Unable to Complete titration study due to masks. Unable to wear them.       DATE OF VISIT: 4/4/2025    HISTORY OF PRESENT ILLNESS: 24 y.o. year old female is here for follow-up of RISHABH.  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 She used to follow at [facility] at that time. She eventually stopped using BiPAP when she was told that her sleep apnea is not as bad anymore.     She complains of snoring with excessive daytime sleepiness and fatigue. Wakes up multiple times in the night to use the bathroom. Complains of witnessed apneas and some gasping episodes during sleep.     Patient underwent sleep study on 2/17/2025 that showed moderate RISHABH with AHI of 22.3/h.  REM AHI of 95/h.  Lowest oxygen saturation 54%.  Patient spent 29 minutes with saturation less than 89%.      REVIEW OF SYSTEMS: 14 point ROS is negative beside mentioned in [place].   CONSTITUTIONAL SYMPTOMS: The patient denies fever, fatigue, night sweats, weight loss or weight gain.   HEENT: No vision changes. No tinnitus, Denies sinus pain. No hoarseness, or dysphagia.   NECK: Patient denies swelling in the neck.   CARDIOVASCULAR: Denies chest pain, palpitation, syncope.  RESPIRATORY: Denies shortness of breath or cough.   GASTROINTESTINAL: Denies nausea, abdominal pain or change in bowel function.  GENITOURINARY: Denies obstructive symptoms. No history of incontinence.  BREASTS: No masses or lumps in the breasts.   SKIN: No rashes or itching.   MUSCULOSKELETAL: Denies weakness or bone pain.   NEUROLOGICAL: No headaches or seizures.   PSYCHIATRIC: Denies mood swings or depression.   ENDOCRINE: Denies heat or cold intolerance or excessive

## 2025-04-07 NOTE — TELEPHONE ENCOUNTER
Nancy Gonsales called in office # [phone] cell # [phone] needing clarification on pt's blood glucose when it should be taken and when to notify      Also was asking about giving pt tylenol what dose and when to give it for pt's pain.     Please advise

## 2025-04-08 ENCOUNTER — RESULTS FOLLOW-UP (OUTPATIENT)
Dept: FAMILY MEDICINE CLINIC | Age: 25
End: 2025-04-08

## 2025-04-08 NOTE — TELEPHONE ENCOUNTER
She is been complaining of feeling dizzy and blurry vision. So advise them to check only when she complaints of blurry vision or dizziness.

## 2025-04-10 RX ORDER — GLUCOSAMINE HCL/CHONDROITIN SU 500-400 MG
CAPSULE ORAL
Qty: 30 STRIP | Refills: 0 | Status: SHIPPED | OUTPATIENT
Start: 2025-04-10

## 2025-04-10 RX ORDER — ACETAMINOPHEN 325 MG/1
650 TABLET ORAL EVERY 6 HOURS PRN
Qty: 120 TABLET | Refills: 3 | Status: SHIPPED | OUTPATIENT
Start: 2025-04-10

## 2025-04-10 NOTE — TELEPHONE ENCOUNTER
I was able to speak with Nancy ([phone]).    The number was documented incorrectly (its [phone]).     Also, they are requesting the blood glucose strips & tylenol be sent to pharmacy again with the REASON of why testing and the high/low BS amount that would prompt a call to our office and a pain level for the tylenol since pt is in caregiver facility.     EXAMPLE:   Check blood sugar once a day if feeling dizzy or blurred vision; if FBS above 150 & PPBS above 200    PRN- pain level over 6/10 or temp above 99.5     RX PENDING- PLEASE REVIEW/ADJUST THEN SEND TO PHARMACY

## 2025-04-10 NOTE — TELEPHONE ENCOUNTER
PLEASE RELAY THE MESSAGE WHEN THEY RETURN CALL-       She is been complaining of feeling dizzy and blurry vision. So advise them to check only when she complaints of blurry vision or dizziness.

## 2025-04-16 ENCOUNTER — TELEPHONE (OUTPATIENT)
Dept: FAMILY MEDICINE CLINIC | Age: 25
End: 2025-04-16

## 2025-04-16 DIAGNOSIS — M25.561 ACUTE PAIN OF RIGHT KNEE: Primary | ICD-10-CM

## 2025-04-16 NOTE — TELEPHONE ENCOUNTER
Pt called in asking about xray results. She was informed of what Dr. Villar said. She wanted to let her know as well her knee is not doing any better and still hurts very much. She can walk but it is painful. She would like to know what Dr. Villar suggests.    Please advise

## 2025-04-17 NOTE — TELEPHONE ENCOUNTER
Left message for patient guardianship to call back regarding  to try therapy for her knee. If not she has also put in a referral

## 2025-04-17 NOTE — TELEPHONE ENCOUNTER
LMOM advising Mallory Rojas (guardianship manager) of providers note & to contact our office must she have any questions or concerns.

## 2025-04-22 ENCOUNTER — TELEPHONE (OUTPATIENT)
Dept: FAMILY MEDICINE CLINIC | Age: 25
End: 2025-04-22

## 2025-04-22 NOTE — TELEPHONE ENCOUNTER
Medication:   Requested Prescriptions     Pending Prescriptions Disp Refills    vitamin D (CHOLECALCIFEROL) 50 MCG (2000 UT) TABS tablet 30 tablet 0     Sig: Take 1 tablet by mouth daily       Last Filled:  06/06/2024    Patient Phone Number: [phone] (home) [phone] (work)    Last appt: 4/3/2025   Next appt: 8/4/2025    Last Labs DM: No results found for: \"VITD25\"

## 2025-04-22 NOTE — TELEPHONE ENCOUNTER
Patient requesting refill of...  vitamin D (CHOLECALCIFEROL) 50 MCG (2000 UT) TABS tablet [[id number]]    Order Details    Dose: 2,000 Units Route: Oral Frequency: DAILY   Dispense Quantity: -- Refills: --          Sig: Take 1 tablet by mouth daily             Last visit date: 4/3/2025    Pharmacy...[facility] [address] - P [phone] - F [phone]

## 2025-04-23 RX ORDER — CHOLECALCIFEROL (VITAMIN D3) 50 MCG
2000 TABLET ORAL DAILY
Qty: 30 TABLET | Refills: 0 | Status: SHIPPED | OUTPATIENT
Start: 2025-04-23

## 2025-04-28 ENCOUNTER — HOSPITAL ENCOUNTER (OUTPATIENT)
Dept: PHYSICAL THERAPY | Age: 25
Setting detail: THERAPIES SERIES
Discharge: HOME | End: 2025-04-28
Payer: MEDICARE

## 2025-04-28 PROCEDURE — 97110 THERAPEUTIC EXERCISES: CPT

## 2025-04-28 PROCEDURE — 97161 PT EVAL LOW COMPLEX 20 MIN: CPT

## 2025-04-28 NOTE — PROGRESS NOTES
education.  Neuromuscular Re-education (00976) activation and proprioception, including postural re-education.    Gait Training (22268) for normalization of ambulation patterns and AD training.   Manual Therapy (30198) as indicated to include: Passive Range of Motion and Soft Tissue Mobilization  Modalities as needed that may include: Cryotherapy, Electrical Stimulation, Biofeedback, Thermal Agents, and Vasoneumatic Compression    Plan: POC initiated as per evaluation    Electronically Signed by STERLING HERBERT, PT  Date: 04/28/2025     Note: Portions of this note have been templated and/or copied from initial evaluation, reassessments and prior notes for documentation efficiency.    Note: If patient does not return for scheduled/recommended follow up visits, this note will serve as a discharge from care along with the most recent update on progress.

## 2025-05-06 ENCOUNTER — HOSPITAL ENCOUNTER (OUTPATIENT)
Dept: PHYSICAL THERAPY | Age: 25
Setting detail: THERAPIES SERIES
Discharge: HOME | End: 2025-05-06
Payer: MEDICARE

## 2025-05-06 PROCEDURE — 97110 THERAPEUTIC EXERCISES: CPT

## 2025-05-06 PROCEDURE — 97140 MANUAL THERAPY 1/> REGIONS: CPT

## 2025-05-06 NOTE — FLOWSHEET NOTE
[facility] - Outpatient Rehabilitation and Therapy: [address] [place] [address] office: [phone] fax: [phone]    Physical Therapy: TREATMENT/PROGRESS NOTE   Patient: Monica Fuller (24 y.o. female)   Examination Date: 2025   :  2000 MRN: [id number]   Visit #: 2   Insurance Allowable Auth Needed     16 V [x]Yes    []No   -     Insurance: Payor: [employer] MEDICARE / Plan: [employer] GOLD PLUS HMO / Product Type: *No Product type* /   Insurance ID: [id number] - (Medicare Managed)  Secondary Insurance (if applicable): MEDICAID [place]   Treatment Diagnosis: Decreased Knee AROM & Strength   No diagnosis found.   Medical Diagnosis:  Acute pain of right knee [M25.561]   Referring Physician: Gabriela Villar MD  PCP: Gabriela Villar MD     Plan of care signed (Y/N): N    Date of Patient follow up with Physician:      Plan of Care Report: NO  POC update due: (10 visits /OR AUTH LIMITS, whichever is less)  2025                                             Medical History:  Comorbidities:  Other Neurological Conditions: Mixed Receptive-expressive language disorder, Prader-Willi Syndrome  Relevant Medical History: Prader-Willi Syndrome                                         Precautions/ Contra-indications:           Latex allergy:  NO  Pacemaker:    NO  Contraindications for Manipulation: None  Date of Surgery: NA  Other:    Red Flags:  None    Suicide Screening:   The patient did not verbalize a primary behavioral concern, suicidal ideation, suicidal intent, or demonstrate suicidal behaviors.    Preferred Language for Healthcare:   [x] English       [] other:    SUBJECTIVE EXAMINATION     Eval: pt presents with c/o B knee pain that started 3 weeks ago while on a walk with the dog and she stumbled and fell. Rates pain a 5-7/10. Denies any previous knee surgeries. Pain with ascending/descending stairs. Has had balance issues at times. Helps take care of animals

## 2025-05-08 ENCOUNTER — HOSPITAL ENCOUNTER (OUTPATIENT)
Dept: PHYSICAL THERAPY | Age: 25
Setting detail: THERAPIES SERIES
Discharge: HOME | End: 2025-05-08
Payer: MEDICARE

## 2025-05-08 PROCEDURE — 97110 THERAPEUTIC EXERCISES: CPT

## 2025-05-08 PROCEDURE — 97140 MANUAL THERAPY 1/> REGIONS: CPT

## 2025-05-08 NOTE — FLOWSHEET NOTE
Today's Assessment:  Pt was late to today's session and with increased pain so only performing NWB exercises. With difficulty supine>sit transition. Pt only able to perform mini range SLR, did better with SAQ with fair quad activation noted. Gave pt a piece of k tape to see if any skin irritation over the weekend to potentially try k tape for B knee support.     Medical Necessity Documentation:  I certify that this patient meets the below criteria necessary for medical necessity for care and/or justification of therapy services:  The patient has functional impairments and/or activity limitations and would benefit from continued outpatient therapy services to address the deficits outlined in the patients goals    Return to Play: NA    Prognosis for POC: [x] Good [] Fair  [] Poor    Patient requires continued skilled intervention: [x] Yes  [] No      CHARGE CAPTURE     PT CHARGE GRID   CPT Code (TIMED) minutes # CPT Code (UNTIMED) #     Therex (14490)  15 1  EVAL:LOW (16986 - Typically 20 minutes face-to-face)     Neuromusc. Re-ed (82557)    Re-Eval (67385)     Manual (81025) 10 1  Estim Unattended (82003)     Ther. Act (80542)    Mech. Traction (30325)     Gait (10469)    Dry Needle 1-2 muscle (21143)     Aquatic Therex (10707)    Dry Needle 3+ muscle (80837)     Iontophoresis (38147)    VASO (87057)     Ultrasound (08285)    Group Therapy (18405)     Estim Attended (23212)    Canalith Repositioning (11932)     Physical Performance Test (12619)    Custom orthotic ()     Other:    Other:    Total Timed Code Tx Minutes 25 2       Total Treatment Minutes 25      Charge Justification:  (48522) THERAPEUTIC EXERCISE - Provided verbal/tactile cueing for HEP and/or activities related to strengthening, flexibility, endurance, ROM performed to prevent loss of range of motion, maintain or improve muscular strength or increase flexibility, following either an injury or surgery.   (78106) MANUAL THERAPY -  Manual therapy

## 2025-05-13 ENCOUNTER — HOSPITAL ENCOUNTER (OUTPATIENT)
Dept: PHYSICAL THERAPY | Age: 25
Setting detail: THERAPIES SERIES
Discharge: HOME | End: 2025-05-13
Payer: MEDICARE

## 2025-05-13 PROCEDURE — 97530 THERAPEUTIC ACTIVITIES: CPT

## 2025-05-13 PROCEDURE — 97110 THERAPEUTIC EXERCISES: CPT

## 2025-05-13 PROCEDURE — 97140 MANUAL THERAPY 1/> REGIONS: CPT

## 2025-05-13 NOTE — FLOWSHEET NOTE
[facility] - Outpatient Rehabilitation and Therapy: [address] [place] [address] office: [phone] fax: [phone]    Physical Therapy: TREATMENT/PROGRESS NOTE   Patient: Monica Fuller (24 y.o. female)   Examination Date: 2025   :  2000 MRN: [id number]   Visit #: 4   Insurance Allowable Auth Needed     16 V [x]Yes    []No   -     Insurance: Payor: [employer] MEDICARE / Plan: HU[employer]NA GOLD PLUS HMO / Product Type: *No Product type* /   Insurance ID: [id number] - (Medicare Managed)  Secondary Insurance (if applicable): MEDICAID [place]   Treatment Diagnosis: Decreased Knee AROM & Strength   No diagnosis found.   Medical Diagnosis:  Acute pain of right knee [M25.561]   Referring Physician: Gabriela Villar MD  PCP: Gabriela Villar MD     Plan of care signed (Y/N): Y    Date of Patient follow up with Physician:      Plan of Care Report: NO  POC update due: (10 visits /OR AUTH LIMITS, whichever is less)  2025                                             Medical History:  Comorbidities:  Other Neurological Conditions: Mixed Receptive-expressive language disorder, Prader-Willi Syndrome  Relevant Medical History: Prader-Willi Syndrome                                         Precautions/ Contra-indications:           Latex allergy:  NO  Pacemaker:    NO  Contraindications for Manipulation: None  Date of Surgery: NA  Other:    Red Flags:  None    Suicide Screening:   The patient did not verbalize a primary behavioral concern, suicidal ideation, suicidal intent, or demonstrate suicidal behaviors.    Preferred Language for Healthcare:   [x] English       [] other:    SUBJECTIVE EXAMINATION     Eval: pt presents with c/o B knee pain that started 3 weeks ago while on a walk with the dog and she stumbled and fell. Rates pain a 5-7/10. Denies any previous knee surgeries. Pain with ascending/descending stairs. Has had balance issues at times. Helps take care of animals

## 2025-05-15 ENCOUNTER — APPOINTMENT (OUTPATIENT)
Dept: PHYSICAL THERAPY | Age: 25
End: 2025-05-15
Payer: MEDICARE

## 2025-05-20 ENCOUNTER — APPOINTMENT (OUTPATIENT)
Dept: PHYSICAL THERAPY | Age: 25
End: 2025-05-20
Payer: MEDICARE

## 2025-05-22 ENCOUNTER — APPOINTMENT (OUTPATIENT)
Dept: PHYSICAL THERAPY | Age: 25
End: 2025-05-22
Payer: MEDICARE

## 2025-05-27 ENCOUNTER — HOSPITAL ENCOUNTER (OUTPATIENT)
Dept: PHYSICAL THERAPY | Age: 25
Setting detail: THERAPIES SERIES
Discharge: HOME | End: 2025-05-27
Payer: MEDICARE

## 2025-05-27 PROCEDURE — 97110 THERAPEUTIC EXERCISES: CPT | Performed by: PHYSICAL THERAPIST

## 2025-05-27 PROCEDURE — 97140 MANUAL THERAPY 1/> REGIONS: CPT | Performed by: PHYSICAL THERAPIST

## 2025-05-27 PROCEDURE — 97530 THERAPEUTIC ACTIVITIES: CPT | Performed by: PHYSICAL THERAPIST

## 2025-05-27 RX ORDER — OMEPRAZOLE 20 MG/1
20 CAPSULE, DELAYED RELEASE ORAL DAILY
COMMUNITY
Start: 2025-05-15

## 2025-05-27 NOTE — FLOWSHEET NOTE
[facility] - Outpatient Rehabilitation and Therapy: [address] [place] [address] office: [phone] fax: [phone]    Physical Therapy: TREATMENT/PROGRESS NOTE   Patient: Monica Fuller (24 y.o. female)   Examination Date: 2025   :  2000 MRN: [id number]   Visit #: 5   Insurance Allowable Auth Needed     16 V [x]Yes    []No   -     Insurance: Payor: HU[employer] MEDICARE / Plan: HU[employer] GOLD PLUS HMO / Product Type: *No Product type* /   Insurance ID: [id number] - (Medicare Managed)  Secondary Insurance (if applicable): MEDICAID [place]   Treatment Diagnosis: Decreased Knee AROM & Strength      Medical Diagnosis:  Acute pain of right knee [M25.561]   Referring Physician: Gabriela Villar MD  PCP: Gabriela Villar MD     Plan of care signed (Y/N): Y    Date of Patient follow up with Physician:      Plan of Care Report: NO  POC update due: (10 visits /OR AUTH LIMITS, whichever is less)  2025                                             Medical History:  Comorbidities:  Other Neurological Conditions: Mixed Receptive-expressive language disorder, Prader-Willi Syndrome  Relevant Medical History: Prader-Willi Syndrome                                         Precautions/ Contra-indications:           Latex allergy:  NO  Pacemaker:    NO  Contraindications for Manipulation: None  Date of Surgery: NA  Other:    Red Flags:  None    Suicide Screening:   The patient did not verbalize a primary behavioral concern, suicidal ideation, suicidal intent, or demonstrate suicidal behaviors.    Preferred Language for Healthcare:   [x] English       [] other:    SUBJECTIVE EXAMINATION     Eval: pt presents with c/o B knee pain that started 3 weeks ago while on a walk with the dog and she stumbled and fell. Rates pain a 5-7/10. Denies any previous knee surgeries. Pain with ascending/descending stairs. Has had balance issues at times. 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

## 2025-05-28 ENCOUNTER — OFFICE VISIT (OUTPATIENT)
Dept: FAMILY MEDICINE CLINIC | Age: 25
End: 2025-05-28

## 2025-05-28 VITALS
DIASTOLIC BLOOD PRESSURE: 89 MMHG | HEIGHT: 60 IN | HEART RATE: 66 BPM | WEIGHT: 163.8 LBS | OXYGEN SATURATION: 100 % | SYSTOLIC BLOOD PRESSURE: 124 MMHG | TEMPERATURE: 98.2 F | BODY MASS INDEX: 32.16 KG/M2

## 2025-05-28 DIAGNOSIS — N89.8 VAGINAL DISCHARGE: ICD-10-CM

## 2025-05-28 DIAGNOSIS — R35.0 FREQUENT URINATION: ICD-10-CM

## 2025-05-28 DIAGNOSIS — Z01.10 NORMAL EAR EXAM: Primary | ICD-10-CM

## 2025-05-28 LAB
BACTERIA URNS QL MICRO: ABNORMAL /HPF
BILIRUB UR QL STRIP.AUTO: NEGATIVE
BILIRUBIN, POC: NEGATIVE
BLOOD URINE, POC: NEGATIVE
CLARITY UR: ABNORMAL
CLARITY, POC: ABNORMAL
COLOR UR: YELLOW
COLOR, POC: ABNORMAL
EPI CELLS #/AREA URNS AUTO: 13 /HPF (ref 0–5)
GLUCOSE UR STRIP.AUTO-MCNC: NEGATIVE MG/DL
GLUCOSE URINE, POC: NEGATIVE MG/DL
HGB UR QL STRIP.AUTO: NEGATIVE
HYALINE CASTS #/AREA URNS AUTO: 1 /LPF (ref 0–8)
KETONES UR STRIP.AUTO-MCNC: ABNORMAL MG/DL
KETONES, POC: ABNORMAL MG/DL
LEUKOCYTE EST, POC: NEGATIVE
LEUKOCYTE ESTERASE UR QL STRIP.AUTO: NEGATIVE
NITRITE UR QL STRIP.AUTO: NEGATIVE
NITRITE, POC: NEGATIVE
PH UR STRIP.AUTO: 7.5 [PH] (ref 5–8)
PH, POC: 7
PROT UR STRIP.AUTO-MCNC: ABNORMAL MG/DL
PROTEIN, POC: NEGATIVE MG/DL
RBC CLUMPS #/AREA URNS AUTO: 1 /HPF (ref 0–4)
SP GR UR STRIP.AUTO: 1.02 (ref 1–1.03)
SPECIFIC GRAVITY, POC: 1.02
UA DIPSTICK W REFLEX MICRO PNL UR: YES
URN SPEC COLLECT METH UR: ABNORMAL
UROBILINOGEN UR STRIP-ACNC: 1 E.U./DL
UROBILINOGEN, POC: 0.2 MG/DL
WBC #/AREA URNS AUTO: 4 /HPF (ref 0–5)

## 2025-05-29 ENCOUNTER — RESULTS FOLLOW-UP (OUTPATIENT)
Dept: FAMILY MEDICINE CLINIC | Age: 25
End: 2025-05-29

## 2025-05-29 ENCOUNTER — APPOINTMENT (OUTPATIENT)
Dept: PHYSICAL THERAPY | Age: 25
End: 2025-05-29
Payer: MEDICARE

## 2025-05-29 LAB
BACTERIA UR CULT: NORMAL
BV BACTERIA DNA VAG QL NAA+PROBE: NOT DETECTED
C GLABRATA DNA VAG QL NAA+PROBE: NORMAL
C GLABRATA DNA VAG QL NAA+PROBE: NOT DETECTED
C KRUSEI DNA VAG QL NAA+PROBE: NOT DETECTED
CANDIDA DNA VAG QL NAA+PROBE: NOT DETECTED
T VAGINALIS DNA VAG QL NAA+PROBE: NOT DETECTED

## 2025-05-29 NOTE — PROGRESS NOTES
(163 lb 12.8 oz)   04/04/25 75.4 kg (166 lb 3.2 oz)   04/03/25 75.8 kg (167 lb)     BP Readings from Last 3 Encounters:   05/28/25 124/89   04/04/25 120/80   04/03/25 133/85       Physical exam:  Constitutional: she is oriented to person, place, and time.she appears well-developed and well-nourished. No distress.   HENT: Normal ear infection.  Head: Normocephalic.   Right Ear: External ear normal. Normal TM   Left Ear: External ear normal. Normal TM  Nose: Nose normal.   Mouth/Throat: Oropharynx is clear and moist.    Cardiovascular: Normal rate, regular rhythm, normal heart sounds   Pulmonary/Chest: Effort normal and breath sounds normal.  Abdominal: Soft. Bowel sounds are normal. she exhibits no distension and no mass.   GENITOURINARY:     Normal female external genitalia.   Vaginal vault: pink and moist with whitish secretions.        Assessment/Plan:   1. Frequent urination  UA negative.  - POCT Urinalysis no Micro  - Culture, Urine  - Urinalysis with Microscopic    2. Vaginal discharge  - VAGINITIS PANEL PCR    3. Normal ear exam  Reassured.    Gabriela Villar MD  5/29/2025 6:58 PM

## 2025-06-02 ENCOUNTER — HOSPITAL ENCOUNTER (OUTPATIENT)
Dept: PHYSICAL THERAPY | Age: 25
Setting detail: THERAPIES SERIES
Discharge: HOME | End: 2025-06-02
Payer: MEDICARE

## 2025-06-02 PROCEDURE — 97110 THERAPEUTIC EXERCISES: CPT

## 2025-06-02 PROCEDURE — 97530 THERAPEUTIC ACTIVITIES: CPT

## 2025-06-02 PROCEDURE — 97140 MANUAL THERAPY 1/> REGIONS: CPT

## 2025-06-02 NOTE — FLOWSHEET NOTE
Code: [email or web address]  URL: [email or web address]  Date: 04/29/2025  Prepared by: Dami Estevez    Exercises  - Supine Short Arc Quad  - 1 x daily - 7 x weekly - 3 sets - 10 reps  - Sidelying Hip Abduction  - 1 x daily - 7 x weekly - 3 sets - 10 reps  - Standing Hip Abduction with Counter Support  - 1 x daily - 7 x weekly - 3 sets - 10 reps  - Standing Tandem Balance with Counter Support  - 1 x daily - 7 x weekly - 3 sets - 10 reps    ASSESSMENT     Today's Assessment: During therapy this date, patient required visual cueing and verbal cueing for exercise progression and improving proper muscle recruitment and activation/motor control patterns.Patient will continue to benefit from ongoing evaluation and advanced clinical decision from a Physical Therapist to address and improve neuromuscular control to safely return to PLOF without symptoms or restrictions.    Medical Necessity Documentation:  I certify that this patient meets the below criteria necessary for medical necessity for care and/or justification of therapy services:  The patient has functional impairments and/or activity limitations and would benefit from continued outpatient therapy services to address the deficits outlined in the patients goals    Return to Play: NA    Prognosis for POC: [x] Good [] Fair  [] Poor    Patient requires continued skilled intervention: [x] Yes  [] No      CHARGE CAPTURE     PT CHARGE GRID   CPT Code (TIMED) minutes # CPT Code (UNTIMED) #     Therex (01546)  15 1  EVAL:LOW (12085 - Typically 20 minutes face-to-face)     Neuromusc. Re-ed (75921)    Re-Eval (72353)     Manual (15264) 11 1  Estim Unattended (33732)     Ther. Act (92009) 10 1  [facility] (51951)     Gait (46484)    Dry Needle 1-2 muscle (29214)     Aquatic Therex (46297)    Dry Needle 3+ muscle (20561)     Iontophoresis (22393)    VASO (44895)     Ultrasound (61416)    Group Therapy (58395)     Estim Attended (59937)    Canalith Repositioning (85139)

## 2025-06-04 ENCOUNTER — HOSPITAL ENCOUNTER (OUTPATIENT)
Dept: PHYSICAL THERAPY | Age: 25
Setting detail: THERAPIES SERIES
End: 2025-06-04
Payer: MEDICARE

## 2025-06-10 ENCOUNTER — HOSPITAL ENCOUNTER (OUTPATIENT)
Dept: PHYSICAL THERAPY | Age: 25
Setting detail: THERAPIES SERIES
Discharge: HOME | End: 2025-06-10
Payer: MEDICARE

## 2025-06-10 PROCEDURE — 97140 MANUAL THERAPY 1/> REGIONS: CPT

## 2025-06-10 PROCEDURE — 97110 THERAPEUTIC EXERCISES: CPT

## 2025-06-10 NOTE — FLOWSHEET NOTE
[facility] - Outpatient Rehabilitation and Therapy: [address] [place] [address] office: [phone] fax: [phone]    Physical Therapy: TREATMENT/PROGRESS NOTE   Patient: Monica Fuller (24 y.o. female)   Examination Date: 06/10/2025   :  2000 MRN: [id number]   Visit #: 7   Insurance Allowable Auth Needed     16 V [x]Yes    []No   -     Insurance: Payor: [employer] MEDICARE / Plan: [employer] GOLD PLUS HMO / Product Type: *No Product type* /   Insurance ID: [id number] - (Medicare Managed)  Secondary Insurance (if applicable): MEDICAID [place]   Treatment Diagnosis: Decreased Knee AROM & Strength      Medical Diagnosis:  Acute pain of right knee [M25.561]   Referring Physician: Gabriela Villar MD  PCP: Gabriela Villar MD     Plan of care signed (Y/N): Y    Date of Patient follow up with Physician:      Plan of Care Report: NO  POC update due: (10 visits /OR AUTH LIMITS, whichever is less)  7/10/2025                                             Medical History:  Comorbidities:  Other Neurological Conditions: Mixed Receptive-expressive language disorder, Prader-Willi Syndrome  Relevant Medical History: Prader-Willi Syndrome                                         Precautions/ Contra-indications:           Latex allergy:  NO  Pacemaker:    NO  Contraindications for Manipulation: None  Date of Surgery: NA  Other:    Red Flags:  None    Suicide Screening:   The patient did not verbalize a primary behavioral concern, suicidal ideation, suicidal intent, or demonstrate suicidal behaviors.    Preferred Language for Healthcare:   [x] English       [] other:    SUBJECTIVE EXAMINATION     Eval: pt presents with c/o B knee pain that started 3 weeks ago while on a walk with the dog and she stumbled and fell. Rates pain a 5-7/10. Denies any previous knee surgeries. Pain with ascending/descending stairs. Has had balance issues at times. 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

## 2025-06-12 ENCOUNTER — TELEPHONE (OUTPATIENT)
Dept: FAMILY MEDICINE CLINIC | Age: 25
End: 2025-06-12

## 2025-06-12 NOTE — TELEPHONE ENCOUNTER
Someone saying they were the caregiver manager for pt called and was trying to get into pts [employer]. She had to do something so she said she would call back.

## 2025-06-13 ENCOUNTER — HOSPITAL ENCOUNTER (OUTPATIENT)
Dept: PHYSICAL THERAPY | Age: 25
Setting detail: THERAPIES SERIES
Discharge: HOME | End: 2025-06-13
Payer: MEDICARE

## 2025-06-13 PROCEDURE — 97530 THERAPEUTIC ACTIVITIES: CPT

## 2025-06-13 PROCEDURE — 97110 THERAPEUTIC EXERCISES: CPT

## 2025-06-13 NOTE — FLOWSHEET NOTE
[facility] - Outpatient Rehabilitation and Therapy: [address] [place] [address] office: [phone] fax: [phone]    Physical Therapy: TREATMENT/PROGRESS NOTE   Patient: Monica Fuller (24 y.o. female)   Examination Date: 2025   :  2000 MRN: [id number]   Visit #: 8   Insurance Allowable Auth Needed     16 V [x]Yes    []No   -     Insurance: Payor: [employer] MEDICARE / Plan: [employer] GOLD PLUS HMO / Product Type: *No Product type* /   Insurance ID: [id number] - (Medicare Managed)  Secondary Insurance (if applicable): MEDICAID [place]   Treatment Diagnosis: Decreased Knee AROM & Strength      Medical Diagnosis:  Acute pain of right knee [M25.561]   Referring Physician: Gabriela Villar MD  PCP: Gabriela Villar MD     Plan of care signed (Y/N): Y    Date of Patient follow up with Physician:      Plan of Care Report: NO  POC update due: (10 visits /OR AUTH LIMITS, whichever is less)  2025                                             Medical History:  Comorbidities:  Other Neurological Conditions: Mixed Receptive-expressive language disorder, Prader-Willi Syndrome  Relevant Medical History: Prader-Willi Syndrome                                         Precautions/ Contra-indications:           Latex allergy:  NO  Pacemaker:    NO  Contraindications for Manipulation: None  Date of Surgery: NA  Other:    Red Flags:  None    Suicide Screening:   The patient did not verbalize a primary behavioral concern, suicidal ideation, suicidal intent, or demonstrate suicidal behaviors.    Preferred Language for Healthcare:   [x] English       [] other:    SUBJECTIVE EXAMINATION     Eval: pt presents with c/o B knee pain that started 3 weeks ago while on a walk with the dog and she stumbled and fell. Rates pain a 5-7/10. Denies any previous knee surgeries. Pain with ascending/descending stairs. Has had balance issues at times. 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

## 2025-06-17 ENCOUNTER — HOSPITAL ENCOUNTER (OUTPATIENT)
Dept: PHYSICAL THERAPY | Age: 25
Setting detail: THERAPIES SERIES
Discharge: HOME | End: 2025-06-17
Payer: MEDICARE

## 2025-06-17 PROCEDURE — 97530 THERAPEUTIC ACTIVITIES: CPT

## 2025-06-17 PROCEDURE — 97112 NEUROMUSCULAR REEDUCATION: CPT

## 2025-06-17 PROCEDURE — 97110 THERAPEUTIC EXERCISES: CPT

## 2025-06-25 ENCOUNTER — HOSPITAL ENCOUNTER (OUTPATIENT)
Dept: PHYSICAL THERAPY | Age: 25
Setting detail: THERAPIES SERIES
End: 2025-06-25
Payer: MEDICARE

## 2025-06-27 ENCOUNTER — HOSPITAL ENCOUNTER (OUTPATIENT)
Dept: PHYSICAL THERAPY | Age: 25
Setting detail: THERAPIES SERIES
End: 2025-06-27
Payer: MEDICARE

## 2025-07-07 RX ORDER — MULTIVIT-MIN/FERROUS SULFATE 4.5 MG
1 TABLET ORAL DAILY
Qty: 30 TABLET | Refills: 5 | Status: SHIPPED | OUTPATIENT
Start: 2025-07-07

## 2025-07-07 NOTE — TELEPHONE ENCOUNTER
Medication:   Requested Prescriptions     Pending Prescriptions Disp Refills    Multiple Vitamins-Minerals (ONE-DAILY MULTI-VIT/MINERAL) TABS [Pharmacy Med Name: ONE-DAILY MULTI-VITAMIN/MINERALS MULTI-VI TABLET] 30 tablet 5     Sig: TAKE 1 TABLET BY MOUTH DAILY        Last Filled:  04/03/2025 #30 3rf     Patient Phone Number: [phone] (home) [phone] (work)    Last appt: 5/28/2025   Next appt: 8/4/2025    Last OARRS:        No data to display

## 2025-07-23 ENCOUNTER — TELEPHONE (OUTPATIENT)
Dept: PULMONOLOGY | Age: 25
End: 2025-07-23

## 2025-07-23 NOTE — TELEPHONE ENCOUNTER
Called and LM for Elizabeth Olivo, guardian of this pt, to see if she got her oral appliance that we ordered in April. She has a follow up with us next week to come in after using the appliance. If she didn't ever get her appliance, we will need to know so we can tell Dr Tobin to see if she still needs to come in for this appt. Tried calling pt but number not in service.

## 2025-08-04 ENCOUNTER — OFFICE VISIT (OUTPATIENT)
Dept: FAMILY MEDICINE CLINIC | Age: 25
End: 2025-08-04
Payer: MEDICARE

## 2025-08-04 VITALS
DIASTOLIC BLOOD PRESSURE: 93 MMHG | BODY MASS INDEX: 32 KG/M2 | SYSTOLIC BLOOD PRESSURE: 127 MMHG | HEART RATE: 72 BPM | HEIGHT: 60 IN | WEIGHT: 163 LBS | OXYGEN SATURATION: 100 %

## 2025-08-04 DIAGNOSIS — R11.0 NAUSEA: ICD-10-CM

## 2025-08-04 DIAGNOSIS — Z00.00 ENCOUNTER FOR ANNUAL WELLNESS VISIT (AWV) IN MEDICARE PATIENT: ICD-10-CM

## 2025-08-04 DIAGNOSIS — K59.00 CONSTIPATION, UNSPECIFIED CONSTIPATION TYPE: ICD-10-CM

## 2025-08-04 DIAGNOSIS — Z00.00 MEDICARE ANNUAL WELLNESS VISIT, SUBSEQUENT: Primary | ICD-10-CM

## 2025-08-04 DIAGNOSIS — F41.1 GENERALIZED ANXIETY DISORDER: ICD-10-CM

## 2025-08-04 PROCEDURE — 99214 OFFICE O/P EST MOD 30 MIN: CPT | Performed by: FAMILY MEDICINE

## 2025-08-04 PROCEDURE — 1036F TOBACCO NON-USER: CPT | Performed by: FAMILY MEDICINE

## 2025-08-04 PROCEDURE — G8427 DOCREV CUR MEDS BY ELIG CLIN: HCPCS | Performed by: FAMILY MEDICINE

## 2025-08-04 PROCEDURE — G8417 CALC BMI ABV UP PARAM F/U: HCPCS | Performed by: FAMILY MEDICINE

## 2025-08-04 PROCEDURE — G2211 COMPLEX E/M VISIT ADD ON: HCPCS | Performed by: FAMILY MEDICINE

## 2025-08-04 PROCEDURE — G0439 PPPS, SUBSEQ VISIT: HCPCS | Performed by: FAMILY MEDICINE

## 2025-08-04 RX ORDER — POLYETHYLENE GLYCOL 3350 17 G/17G
17 POWDER, FOR SOLUTION ORAL DAILY
Qty: 1530 G | Refills: 1 | Status: SHIPPED | OUTPATIENT
Start: 2025-08-04 | End: 2025-09-03

## 2025-08-04 RX ORDER — HYDROXYZINE HYDROCHLORIDE 25 MG/1
25 TABLET, FILM COATED ORAL 2 TIMES DAILY
Qty: 60 TABLET | Refills: 0 | Status: SHIPPED | OUTPATIENT
Start: 2025-08-04

## 2025-08-04 RX ORDER — ONDANSETRON 4 MG/1
4 TABLET, FILM COATED ORAL DAILY PRN
Qty: 20 TABLET | Refills: 0 | Status: SHIPPED | OUTPATIENT
Start: 2025-08-04

## 2025-08-04 ASSESSMENT — PATIENT HEALTH QUESTIONNAIRE - PHQ9
SUM OF ALL RESPONSES TO PHQ QUESTIONS 1-9: 0
1. LITTLE INTEREST OR PLEASURE IN DOING THINGS: NOT AT ALL
2. FEELING DOWN, DEPRESSED OR HOPELESS: NOT AT ALL
SUM OF ALL RESPONSES TO PHQ QUESTIONS 1-9: 0

## 2025-08-04 ASSESSMENT — LIFESTYLE VARIABLES
HOW MANY STANDARD DRINKS CONTAINING ALCOHOL DO YOU HAVE ON A TYPICAL DAY: PATIENT DOES NOT DRINK
HOW OFTEN DO YOU HAVE A DRINK CONTAINING ALCOHOL: NEVER

## 2025-09-02 ENCOUNTER — HOSPITAL ENCOUNTER (OUTPATIENT)
Dept: GENERAL RADIOLOGY | Age: 25
Discharge: HOME | End: 2025-09-02
Payer: MEDICARE

## 2025-09-02 ENCOUNTER — OFFICE VISIT (OUTPATIENT)
Dept: FAMILY MEDICINE CLINIC | Age: 25
End: 2025-09-02
Payer: MEDICARE

## 2025-09-02 VITALS
WEIGHT: 161.8 LBS | DIASTOLIC BLOOD PRESSURE: 95 MMHG | SYSTOLIC BLOOD PRESSURE: 135 MMHG | HEIGHT: 60 IN | HEART RATE: 76 BPM | OXYGEN SATURATION: 99 % | TEMPERATURE: 98.4 F | BODY MASS INDEX: 31.77 KG/M2
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
## 2025-09-02 DIAGNOSIS — R03.0 ELEVATED BLOOD PRESSURE READING: ICD-10-CM

## 2025-09-02 PROCEDURE — G8417 CALC BMI ABV UP PARAM F/U: HCPCS | Performed by: FAMILY MEDICINE

## 2025-09-02 PROCEDURE — 99213 OFFICE O/P EST LOW 20 MIN: CPT | Performed by: FAMILY MEDICINE

## 2025-09-02 PROCEDURE — 1036F TOBACCO NON-USER: CPT | Performed by: FAMILY MEDICINE

## 2025-09-02 PROCEDURE — 73090 X-RAY EXAM OF FOREARM: CPT

## 2025-09-02 PROCEDURE — G8427 DOCREV CUR MEDS BY ELIG CLIN: HCPCS | Performed by: FAMILY MEDICINE

## 2025-09-02 PROCEDURE — G2211 COMPLEX E/M VISIT ADD ON: HCPCS | Performed by: FAMILY MEDICINE

## 2025-09-02 RX ORDER — IBUPROFEN 400 MG/1
400 TABLET, FILM COATED ORAL 2 TIMES DAILY
Qty: 14 TABLET | Refills: 0 | Status: SHIPPED | OUTPATIENT
Start: 2025-09-02 | End: 2025-09-09

## 2025-09-02 RX ORDER — ARM BRACE
EACH MISCELLANEOUS
Qty: 1 EACH | Refills: 0 | Status: SHIPPED | OUTPATIENT
Start: 2025-09-02